# Patient Record
Sex: MALE | Race: WHITE | Employment: OTHER | URBAN - METROPOLITAN AREA
[De-identification: names, ages, dates, MRNs, and addresses within clinical notes are randomized per-mention and may not be internally consistent; named-entity substitution may affect disease eponyms.]

---

## 2017-05-25 ENCOUNTER — HOSPITAL ENCOUNTER (OUTPATIENT)
Dept: MRI IMAGING | Age: 72
Discharge: HOME | End: 2017-05-25
Attending: PHYSICAL MEDICINE & REHABILITATION
Payer: MEDICARE

## 2017-05-25 DIAGNOSIS — M51.36 DDD (DEGENERATIVE DISC DISEASE), LUMBAR: ICD-10-CM

## 2017-05-25 PROCEDURE — 72148 MRI LUMBAR SPINE W/O DYE: CPT

## 2017-06-22 ENCOUNTER — HOSPITAL ENCOUNTER (OUTPATIENT)
Dept: MRI IMAGING | Age: 72
Discharge: HOME | End: 2017-06-22
Attending: ORTHOPAEDIC SURGERY
Payer: MEDICARE

## 2017-06-22 DIAGNOSIS — M25.562 LEFT KNEE PAIN: ICD-10-CM

## 2017-06-22 PROCEDURE — 73721 MRI JNT OF LWR EXTRE W/O DYE: CPT

## 2018-03-14 ENCOUNTER — HOSPITAL ENCOUNTER (OUTPATIENT)
Age: 73
Setting detail: OUTPATIENT SURGERY
Discharge: HOME | End: 2018-03-14
Attending: INTERNAL MEDICINE | Admitting: INTERNAL MEDICINE
Payer: MEDICARE

## 2018-03-14 ENCOUNTER — ANESTHESIA EVENT (OUTPATIENT)
Dept: ENDOSCOPY | Age: 73
End: 2018-03-14
Payer: MEDICARE

## 2018-03-14 ENCOUNTER — ANESTHESIA (OUTPATIENT)
Dept: ENDOSCOPY | Age: 73
End: 2018-03-14
Payer: MEDICARE

## 2018-03-14 VITALS
WEIGHT: 200 LBS | HEIGHT: 73 IN | HEART RATE: 65 BPM | DIASTOLIC BLOOD PRESSURE: 78 MMHG | BODY MASS INDEX: 26.51 KG/M2 | TEMPERATURE: 97.9 F | RESPIRATION RATE: 14 BRPM | OXYGEN SATURATION: 100 % | SYSTOLIC BLOOD PRESSURE: 123 MMHG

## 2018-03-14 PROCEDURE — 76040000019: Performed by: INTERNAL MEDICINE

## 2018-03-14 PROCEDURE — 77030009426 HC FCPS BIOP ENDOSC BSC -B: Performed by: INTERNAL MEDICINE

## 2018-03-14 PROCEDURE — 74011250636 HC RX REV CODE- 250/636

## 2018-03-14 PROCEDURE — 74011000250 HC RX REV CODE- 250

## 2018-03-14 PROCEDURE — 76060000031 HC ANESTHESIA FIRST 0.5 HR: Performed by: INTERNAL MEDICINE

## 2018-03-14 PROCEDURE — 88305 TISSUE EXAM BY PATHOLOGIST: CPT | Performed by: INTERNAL MEDICINE

## 2018-03-14 RX ORDER — PROPOFOL 10 MG/ML
INJECTION, EMULSION INTRAVENOUS AS NEEDED
Status: DISCONTINUED | OUTPATIENT
Start: 2018-03-14 | End: 2018-03-14 | Stop reason: HOSPADM

## 2018-03-14 RX ORDER — SODIUM CHLORIDE 9 MG/ML
INJECTION, SOLUTION INTRAVENOUS
Status: DISCONTINUED | OUTPATIENT
Start: 2018-03-14 | End: 2018-03-14 | Stop reason: HOSPADM

## 2018-03-14 RX ORDER — MIDAZOLAM HYDROCHLORIDE 1 MG/ML
.25-5 INJECTION, SOLUTION INTRAMUSCULAR; INTRAVENOUS
Status: DISCONTINUED | OUTPATIENT
Start: 2018-03-14 | End: 2018-03-14 | Stop reason: HOSPADM

## 2018-03-14 RX ORDER — SODIUM CHLORIDE 0.9 % (FLUSH) 0.9 %
5-10 SYRINGE (ML) INJECTION EVERY 8 HOURS
Status: DISCONTINUED | OUTPATIENT
Start: 2018-03-14 | End: 2018-03-14 | Stop reason: HOSPADM

## 2018-03-14 RX ORDER — FENTANYL CITRATE 50 UG/ML
25 INJECTION, SOLUTION INTRAMUSCULAR; INTRAVENOUS
Status: DISCONTINUED | OUTPATIENT
Start: 2018-03-14 | End: 2018-03-14 | Stop reason: HOSPADM

## 2018-03-14 RX ORDER — MIDAZOLAM HYDROCHLORIDE 1 MG/ML
1-2 INJECTION, SOLUTION INTRAMUSCULAR; INTRAVENOUS
Status: DISCONTINUED | OUTPATIENT
Start: 2018-03-14 | End: 2018-03-14 | Stop reason: HOSPADM

## 2018-03-14 RX ORDER — DEXTROMETHORPHAN/PSEUDOEPHED 2.5-7.5/.8
1.2 DROPS ORAL
Status: DISCONTINUED | OUTPATIENT
Start: 2018-03-14 | End: 2018-03-14 | Stop reason: HOSPADM

## 2018-03-14 RX ORDER — SODIUM CHLORIDE 0.9 % (FLUSH) 0.9 %
5-10 SYRINGE (ML) INJECTION AS NEEDED
Status: DISCONTINUED | OUTPATIENT
Start: 2018-03-14 | End: 2018-03-14 | Stop reason: HOSPADM

## 2018-03-14 RX ORDER — FLUMAZENIL 0.1 MG/ML
0.2 INJECTION INTRAVENOUS
Status: DISCONTINUED | OUTPATIENT
Start: 2018-03-14 | End: 2018-03-14 | Stop reason: HOSPADM

## 2018-03-14 RX ORDER — ATROPINE SULFATE 0.1 MG/ML
0.5 INJECTION INTRAVENOUS
Status: DISCONTINUED | OUTPATIENT
Start: 2018-03-14 | End: 2018-03-14 | Stop reason: HOSPADM

## 2018-03-14 RX ORDER — NALOXONE HYDROCHLORIDE 0.4 MG/ML
0.4 INJECTION, SOLUTION INTRAMUSCULAR; INTRAVENOUS; SUBCUTANEOUS
Status: DISCONTINUED | OUTPATIENT
Start: 2018-03-14 | End: 2018-03-14 | Stop reason: HOSPADM

## 2018-03-14 RX ORDER — EPINEPHRINE 0.1 MG/ML
1 INJECTION INTRACARDIAC; INTRAVENOUS
Status: DISCONTINUED | OUTPATIENT
Start: 2018-03-14 | End: 2018-03-14 | Stop reason: HOSPADM

## 2018-03-14 RX ORDER — SODIUM CHLORIDE 9 MG/ML
75 INJECTION, SOLUTION INTRAVENOUS CONTINUOUS
Status: DISCONTINUED | OUTPATIENT
Start: 2018-03-14 | End: 2018-03-14 | Stop reason: HOSPADM

## 2018-03-14 RX ORDER — LIDOCAINE HYDROCHLORIDE 20 MG/ML
INJECTION, SOLUTION EPIDURAL; INFILTRATION; INTRACAUDAL; PERINEURAL AS NEEDED
Status: DISCONTINUED | OUTPATIENT
Start: 2018-03-14 | End: 2018-03-14 | Stop reason: HOSPADM

## 2018-03-14 RX ADMIN — PROPOFOL 30 MG: 10 INJECTION, EMULSION INTRAVENOUS at 15:45

## 2018-03-14 RX ADMIN — PROPOFOL 20 MG: 10 INJECTION, EMULSION INTRAVENOUS at 15:51

## 2018-03-14 RX ADMIN — PROPOFOL 30 MG: 10 INJECTION, EMULSION INTRAVENOUS at 15:48

## 2018-03-14 RX ADMIN — SODIUM CHLORIDE: 9 INJECTION, SOLUTION INTRAVENOUS at 15:30

## 2018-03-14 RX ADMIN — PROPOFOL 30 MG: 10 INJECTION, EMULSION INTRAVENOUS at 15:50

## 2018-03-14 RX ADMIN — PROPOFOL 70 MG: 10 INJECTION, EMULSION INTRAVENOUS at 15:43

## 2018-03-14 RX ADMIN — LIDOCAINE HYDROCHLORIDE 50 MG: 20 INJECTION, SOLUTION EPIDURAL; INFILTRATION; INTRACAUDAL; PERINEURAL at 15:42

## 2018-03-14 NOTE — ROUTINE PROCESS
Nakia Mccloud  1945  [id number]    Situation:  Verbal report received from: Elizabeth Mccloud RN  Procedure: Procedure(s):  COLONOSCOPY  COLON BIOPSY    Background:    Preoperative diagnosis: colon  Postoperative diagnosis: diverticulosis , hemorrhoids     :  Dr. Marylu Sousa  Assistant(s): Endoscopy Technician-1: Rasta Pearson  Endoscopy RN-1: Elizabeth Mccloud RN    Specimens:   ID Type Source Tests Collected by Time Destination   1 : ascendign colon bx  Preservative Colon, Ascending  Teri Matthew MD 3/14/2018 1551 Pathology     H. Pylori  no      Anesthesia gave intra-procedure sedation and medications, see anesthesia flow sheet yes    Intravenous fluids: NS@ KVO     Vital signs stable      Abdominal assessment: round and soft      Recommendation:  Discharge patient per MD order .      Family or Friend    Permission to share finding with family or friend yes

## 2018-03-14 NOTE — PROCEDURES
NAME:  Balaji Nathan. :   1945   MRN:   [id number]     Date/Time:  3/14/2018 3:59 PM    Colonoscopy Operative Report    Procedure Type:   Colonoscopy with biopsy     Indications:     Constipation, Change in bowel habits  Pre-operative Diagnosis: see indication above  Post-operative Diagnosis:  See findings below  :  Mary Ellen Chung MD  Referring Provider: -Ave Ford MD    Exam:  Airway: clear, no airway problems anticipated  Heart: RRR, without gallops or rubs  Lungs: clear bilaterally without wheezes, crackles, or rhonchi  Abdomen: soft, nontender, nondistended, bowel sounds present  Mental Status: awake, alert and oriented to person, place and time    Sedation:  MAC anesthesia Propofol 180mg IV  Procedure Details:  After informed consent was obtained with all risks and benefits of procedure explained and preoperative exam completed, the patient was taken to the endoscopy suite and placed in the left lateral decubitus position. Upon sequential sedation as per above, a digital rectal exam was performed demonstrating internal hemorrhoids. The Olympus videocolonoscope  was inserted in the rectum and carefully advanced to the cecum, which was identified by the ileocecal valve and appendiceal orifice. The distal terminal ileum was evaluated. The quality of preparation was adequate. The colonoscope was slowly withdrawn with careful evaluation between folds. Retroflexion in the rectum was completed demonstrating internal hemorrhoids. Findings:     -Normal terminal ileum  -Rare shallow diverticula seen scattered throughout the colon  -No masses, polyps, or inflammation are identified. Biopsies obtained in ascending colon to exclude microscopic colitis    Specimen Removed:  #1 asc colon  Complications: None. EBL:  None. Impression:    -Normal terminal ileum  -Rare shallow diverticula seen scattered throughout the colon  -No masses, polyps, or inflammation are identified.   Biopsies obtained in ascending colon to exclude microscopic colitis    Recommendations: --Await pathology. , -No further colonoscopy indicated. High fiber diet. Resume normal medication(s). You will receive a letter about the biopsy results in about 10 days. You may be asked to call your doctor's office for the results. Discharge Disposition:  Home in the company of a  when able to ambulate.     Mary David MD

## 2018-03-14 NOTE — DISCHARGE INSTRUCTIONS
[facility]  [id number]  1945    COLON DISCHARGE INSTRUCTIONS  Discomfort:  Redness at IV site- apply warm compress to area; if redness or soreness persist- contact your physician  There may be a slight amount of blood passed from the rectum  Gaseous discomfort- walking, belching will help relieve any discomfort  You may not operate a vehicle for 12 hours  You may not engage in an occupation involving machinery or appliances for rest of today  You may not drink alcoholic beverages for at least 12 hours  Avoid making any critical decisions for at least 24 hour  DIET:   High fiber diet. - however -  remember your colon is empty and a heavy meal will produce gas. Avoid these foods:  vegetables, fried / greasy foods, carbonated drinks for today  MEDICATION:         ACTIVITY:  You may not resume your normal daily activities until tomorrow AM; it is recommended that you spend the remainder of the day resting -  avoid any strenuous activity. CALL M.D. ANY SIGN OF:   Increasing pain, nausea, vomiting  Abdominal distension (swelling)  New increased bleeding (oral or rectal)  Fever (chills)    IMPRESSION:  -Normal terminal ileum  -Rare shallow diverticula seen scattered throughout the colon  -No masses, polyps, or inflammation are identified. Biopsies obtained in ascending colon to exclude microscopic colitis    Follow-up Instructions:   Call Dr. Sherice Gaitan for the results of procedure / biopsy in 7-10 days  Telephone # [phone]  No repeat colonoscopy indicated    Tamie Chavez MD      [employer] Activation    Thank you for requesting access to [employer]. Please follow the instructions below to securely access and download your online medical record. [employer] allows you to send messages to your doctor, view your test results, renew your prescriptions, schedule appointments, and more. How Do I Sign Up? 1. In your internet browser, go to [email or web address]  2. Click on the First Time User?  Click Here link in the Sign In box. You will be redirect to the New Member Sign Up page. 3. Enter your [employer] Access Code exactly as it appears below. You will not need to use this code after youve completed the sign-up process. If you do not sign up before the expiration date, you must request a new code. [employer] Access Code: [email or web address]  Expires: 2018  2:21 PM (This is the date your [employer] access code will )    4. Enter the last four digits of your Social Security Number (xxxx) and Date of Birth (mm/dd/yyyy) as indicated and click Submit. You will be taken to the next sign-up page. 5. Create a [employer] ID. This will be your [employer] login ID and cannot be changed, so think of one that is secure and easy to remember. 6. Create a [employer] password. You can change your password at any time. 7. Enter your Password Reset Question and Answer. This can be used at a later time if you forget your password. 8. Enter your e-mail address. You will receive e-mail notification when new information is available in [address] 9. Click Sign Up. You can now view and download portions of your medical record. 10. Click the Download Summary menu link to download a portable copy of your medical information. Additional Information    If you have questions, please visit the Frequently Asked Questions section of the [employer] website at [email or web address] Remember, [employer] is NOT to be used for urgent needs. For medical emergencies, dial 911.

## 2018-03-14 NOTE — ANESTHESIA POSTPROCEDURE EVALUATION
Post-Anesthesia Evaluation and Assessment    Patient: Robert Kelley MRN: [id number]  SSN: [id number]    YOB: 1945  Age: 75 y.o. Sex: male       Cardiovascular Function/Vital Signs  Visit Vitals    /63    Pulse 60    Temp 36.6 °C (97.9 °F)    Resp 18    Ht 6' 1\" (1.854 m)    Wt 90.7 kg (200 lb)    SpO2 100%    BMI 26.39 kg/m2       Patient is status post general, total IV anesthesia anesthesia for Procedure(s):  COLONOSCOPY  COLON BIOPSY. Nausea/Vomiting: None    Postoperative hydration reviewed and adequate. Pain:  Pain Scale 1: Numeric (0 - 10) (03/14/18 1612)  Pain Intensity 1: 0 (03/14/18 1612)   Managed    Neurological Status: At baseline    Mental Status and Level of Consciousness: Arousable    Pulmonary Status:   O2 Device: Room air (03/14/18 1612)   Adequate oxygenation and airway patent    Complications related to anesthesia: None    Post-anesthesia assessment completed.  No concerns    Signed By: Saul Lane MD     March 14, 2018

## 2018-03-14 NOTE — ANESTHESIA PREPROCEDURE EVALUATION
Anesthetic History   No history of anesthetic complications            Review of Systems / Medical History  Patient summary reviewed, nursing notes reviewed and pertinent labs reviewed    Pulmonary  Within defined limits                 Neuro/Psych   Within defined limits           Cardiovascular    Hypertension              Exercise tolerance: >4 METS     GI/Hepatic/Renal  Within defined limits              Endo/Other        Arthritis     Other Findings            Physical Exam    Airway  Mallampati: II  TM Distance: 4 - 6 cm  Neck ROM: normal range of motion   Mouth opening: Normal     Cardiovascular  Regular rate and rhythm,  S1 and S2 normal,  no murmur, click, rub, or gallop             Dental  No notable dental hx       Pulmonary  Breath sounds clear to auscultation               Abdominal  GI exam deferred       Other Findings            Anesthetic Plan    ASA: 2  Anesthesia type: general and total IV anesthesia          Induction: Intravenous  Anesthetic plan and risks discussed with: Patient

## 2018-10-02 ENCOUNTER — HOSPITAL ENCOUNTER (OUTPATIENT)
Dept: PREADMISSION TESTING | Age: 73
Discharge: HOME | End: 2018-10-02
Payer: MEDICARE

## 2018-10-02 VITALS
WEIGHT: 210 LBS | SYSTOLIC BLOOD PRESSURE: 124 MMHG | BODY MASS INDEX: 27.83 KG/M2 | HEART RATE: 69 BPM | DIASTOLIC BLOOD PRESSURE: 64 MMHG | HEIGHT: 73 IN | TEMPERATURE: 97.9 F

## 2018-10-02 LAB
ABO + RH BLD: NORMAL
ALBUMIN SERPL-MCNC: 3.9 G/DL (ref 3.5–5)
ALBUMIN/GLOB SERPL: 1.1 {RATIO} (ref 1.1–2.2)
ALP SERPL-CCNC: 70 U/L (ref 45–117)
ALT SERPL-CCNC: 19 U/L (ref 12–78)
ANION GAP SERPL CALC-SCNC: 9 MMOL/L (ref 5–15)
APPEARANCE UR: CLEAR
AST SERPL-CCNC: 13 U/L (ref 15–37)
ATRIAL RATE: 57 BPM
BACTERIA URNS QL MICRO: NEGATIVE /HPF
BASOPHILS # BLD: 0 K/UL (ref 0–0.1)
BASOPHILS NFR BLD: 0 % (ref 0–1)
BILIRUB SERPL-MCNC: 0.5 MG/DL (ref 0.2–1)
BILIRUB UR QL: NEGATIVE
BLOOD GROUP ANTIBODIES SERPL: NORMAL
BUN SERPL-MCNC: 17 MG/DL (ref 6–20)
BUN/CREAT SERPL: 20 (ref 12–20)
CALCIUM SERPL-MCNC: 9.4 MG/DL (ref 8.5–10.1)
CALCULATED P AXIS, ECG09: 50 DEGREES
CALCULATED R AXIS, ECG10: 74 DEGREES
CALCULATED T AXIS, ECG11: 66 DEGREES
CHLORIDE SERPL-SCNC: 104 MMOL/L (ref 97–108)
CO2 SERPL-SCNC: 27 MMOL/L (ref 21–32)
COLOR UR: NORMAL
CREAT SERPL-MCNC: 0.83 MG/DL (ref 0.7–1.3)
DIAGNOSIS, 93000: NORMAL
DIFFERENTIAL METHOD BLD: ABNORMAL
EOSINOPHIL # BLD: 0 K/UL (ref 0–0.4)
EOSINOPHIL NFR BLD: 0 % (ref 0–7)
EPITH CASTS URNS QL MICRO: NORMAL /LPF
ERYTHROCYTE [DISTWIDTH] IN BLOOD BY AUTOMATED COUNT: 14 % (ref 11.5–14.5)
GLOBULIN SER CALC-MCNC: 3.4 G/DL (ref 2–4)
GLUCOSE SERPL-MCNC: 112 MG/DL (ref 65–100)
GLUCOSE UR STRIP.AUTO-MCNC: NEGATIVE MG/DL
HCT VFR BLD AUTO: 47.8 % (ref 36.6–50.3)
HGB BLD-MCNC: 15.9 G/DL (ref 12.1–17)
HGB UR QL STRIP: NEGATIVE
HYALINE CASTS URNS QL MICRO: NORMAL /LPF (ref 0–5)
IMM GRANULOCYTES # BLD: 0.1 K/UL (ref 0–0.04)
IMM GRANULOCYTES NFR BLD AUTO: 1 % (ref 0–0.5)
KETONES UR QL STRIP.AUTO: NEGATIVE MG/DL
LEUKOCYTE ESTERASE UR QL STRIP.AUTO: NEGATIVE
LYMPHOCYTES # BLD: 0.8 K/UL (ref 0.8–3.5)
LYMPHOCYTES NFR BLD: 8 % (ref 12–49)
MCH RBC QN AUTO: 29.2 PG (ref 26–34)
MCHC RBC AUTO-ENTMCNC: 33.3 G/DL (ref 30–36.5)
MCV RBC AUTO: 87.9 FL (ref 80–99)
MONOCYTES # BLD: 0.6 K/UL (ref 0–1)
MONOCYTES NFR BLD: 6 % (ref 5–13)
NEUTS SEG # BLD: 8.6 K/UL (ref 1.8–8)
NEUTS SEG NFR BLD: 85 % (ref 32–75)
NITRITE UR QL STRIP.AUTO: NEGATIVE
NRBC # BLD: 0 K/UL (ref 0–0.01)
NRBC BLD-RTO: 0 PER 100 WBC
P-R INTERVAL, ECG05: 268 MS
PH UR STRIP: 6.5 [PH] (ref 5–8)
PLATELET # BLD AUTO: 233 K/UL (ref 150–400)
PMV BLD AUTO: 10.7 FL (ref 8.9–12.9)
POTASSIUM SERPL-SCNC: 4.1 MMOL/L (ref 3.5–5.1)
PROT SERPL-MCNC: 7.3 G/DL (ref 6.4–8.2)
PROT UR STRIP-MCNC: NEGATIVE MG/DL
Q-T INTERVAL, ECG07: 440 MS
QRS DURATION, ECG06: 90 MS
QTC CALCULATION (BEZET), ECG08: 428 MS
RBC # BLD AUTO: 5.44 M/UL (ref 4.1–5.7)
RBC #/AREA URNS HPF: NORMAL /HPF (ref 0–5)
SODIUM SERPL-SCNC: 140 MMOL/L (ref 136–145)
SP GR UR REFRACTOMETRY: 1.01 (ref 1–1.03)
SPECIMEN EXP DATE BLD: NORMAL
UROBILINOGEN UR QL STRIP.AUTO: 0.2 EU/DL (ref 0.2–1)
VENTRICULAR RATE, ECG03: 57 BPM
WBC # BLD AUTO: 10.1 K/UL (ref 4.1–11.1)
WBC URNS QL MICRO: NORMAL /HPF (ref 0–4)

## 2018-10-02 PROCEDURE — 36415 COLL VENOUS BLD VENIPUNCTURE: CPT | Performed by: UROLOGY

## 2018-10-02 PROCEDURE — 81001 URINALYSIS AUTO W/SCOPE: CPT | Performed by: UROLOGY

## 2018-10-02 PROCEDURE — 93005 ELECTROCARDIOGRAM TRACING: CPT

## 2018-10-02 PROCEDURE — 86900 BLOOD TYPING SEROLOGIC ABO: CPT | Performed by: UROLOGY

## 2018-10-02 PROCEDURE — 85025 COMPLETE CBC W/AUTO DIFF WBC: CPT | Performed by: UROLOGY

## 2018-10-02 PROCEDURE — 87086 URINE CULTURE/COLONY COUNT: CPT | Performed by: UROLOGY

## 2018-10-02 PROCEDURE — 80053 COMPREHEN METABOLIC PANEL: CPT | Performed by: UROLOGY

## 2018-10-02 RX ORDER — GLUCOSAM/CHONDRO/HERB 149/HYAL 750-100 MG
4 TABLET ORAL DAILY
COMMUNITY
End: 2018-10-19

## 2018-10-02 RX ORDER — ASPIRIN 81 MG/1
TABLET ORAL DAILY
COMMUNITY
End: 2018-10-19

## 2018-10-03 LAB
BACTERIA SPEC CULT: NORMAL
CC UR VC: NORMAL
SERVICE CMNT-IMP: NORMAL

## 2018-10-16 ENCOUNTER — ANESTHESIA EVENT (OUTPATIENT)
Dept: SURGERY | Age: 73
DRG: 708 | End: 2018-10-16
Payer: MEDICARE

## 2018-10-16 ENCOUNTER — ANESTHESIA (OUTPATIENT)
Dept: SURGERY | Age: 73
DRG: 708 | End: 2018-10-16
Payer: MEDICARE

## 2018-10-16 ENCOUNTER — HOSPITAL ENCOUNTER (INPATIENT)
Age: 73
LOS: 3 days | Discharge: HOME HEALTH | DRG: 708 | End: 2018-10-19
Attending: UROLOGY | Admitting: UROLOGY
Payer: MEDICARE

## 2018-10-16 DIAGNOSIS — Z90.79 S/P PROSTATECTOMY: Primary | ICD-10-CM

## 2018-10-16 PROBLEM — N40.0 BPH (BENIGN PROSTATIC HYPERPLASIA): Status: ACTIVE | Noted: 2018-10-16

## 2018-10-16 PROCEDURE — 77030011640 HC PAD GRND REM COVD -A: Performed by: UROLOGY

## 2018-10-16 PROCEDURE — 0VB04ZZ EXCISION OF PROSTATE, PERCUTANEOUS ENDOSCOPIC APPROACH: ICD-10-PCS | Performed by: UROLOGY

## 2018-10-16 PROCEDURE — 88307 TISSUE EXAM BY PATHOLOGIST: CPT

## 2018-10-16 PROCEDURE — 77030014650 HC SEAL MTRX FLOSEL BAXT -C: Performed by: UROLOGY

## 2018-10-16 PROCEDURE — 77030008684 HC TU ET CUF COVD -B: Performed by: ANESTHESIOLOGY

## 2018-10-16 PROCEDURE — 77030012407 HC DRN WND BARD -B: Performed by: UROLOGY

## 2018-10-16 PROCEDURE — 77030013567 HC DRN WND RESERV BARD -A: Performed by: UROLOGY

## 2018-10-16 PROCEDURE — 74011250637 HC RX REV CODE- 250/637: Performed by: UROLOGY

## 2018-10-16 PROCEDURE — 0V504ZZ DESTRUCTION OF PROSTATE, PERCUTANEOUS ENDOSCOPIC APPROACH: ICD-10-PCS | Performed by: UROLOGY

## 2018-10-16 PROCEDURE — 77030002896 HC SUT CLP ABSRB J&J -B: Performed by: UROLOGY

## 2018-10-16 PROCEDURE — 76210000016 HC OR PH I REC 1 TO 1.5 HR: Performed by: UROLOGY

## 2018-10-16 PROCEDURE — 0TJB8ZZ INSPECTION OF BLADDER, VIA NATURAL OR ARTIFICIAL OPENING ENDOSCOPIC: ICD-10-PCS | Performed by: UROLOGY

## 2018-10-16 PROCEDURE — C1769 GUIDE WIRE: HCPCS | Performed by: UROLOGY

## 2018-10-16 PROCEDURE — 77030022704 HC SUT VLOC COVD -B: Performed by: UROLOGY

## 2018-10-16 PROCEDURE — C1758 CATHETER, URETERAL: HCPCS | Performed by: UROLOGY

## 2018-10-16 PROCEDURE — 74011250636 HC RX REV CODE- 250/636: Performed by: ANESTHESIOLOGY

## 2018-10-16 PROCEDURE — 77030005546 HC CATH URETH FOL 3W BARD -A: Performed by: UROLOGY

## 2018-10-16 PROCEDURE — 77030002916 HC SUT ETHLN J&J -A: Performed by: UROLOGY

## 2018-10-16 PROCEDURE — 77030002933 HC SUT MCRYL J&J -A: Performed by: UROLOGY

## 2018-10-16 PROCEDURE — 77030018832 HC SOL IRR H20 ICUM -A: Performed by: UROLOGY

## 2018-10-16 PROCEDURE — 77030012024 HC APPL CLP LAPRA J&J -G1: Performed by: UROLOGY

## 2018-10-16 PROCEDURE — 77030002966 HC SUT PDS J&J -A: Performed by: UROLOGY

## 2018-10-16 PROCEDURE — 65270000029 HC RM PRIVATE

## 2018-10-16 PROCEDURE — 77030008756 HC TU IRR SUC STRY -B: Performed by: UROLOGY

## 2018-10-16 PROCEDURE — 77030007955 HC PCH ENDOSC SPEC J&J -B: Performed by: UROLOGY

## 2018-10-16 PROCEDURE — 77030010935 HC CLP LIG ABSRB TELE -B: Performed by: UROLOGY

## 2018-10-16 PROCEDURE — 74011000250 HC RX REV CODE- 250: Performed by: UROLOGY

## 2018-10-16 PROCEDURE — 88305 TISSUE EXAM BY PATHOLOGIST: CPT | Performed by: UROLOGY

## 2018-10-16 PROCEDURE — 77030010517 HC APPL SEAL FLOSEL BAXT -B: Performed by: UROLOGY

## 2018-10-16 PROCEDURE — 77030019908 HC STETH ESOPH SIMS -A: Performed by: ANESTHESIOLOGY

## 2018-10-16 PROCEDURE — 74011250636 HC RX REV CODE- 250/636

## 2018-10-16 PROCEDURE — 77030035277 HC OBTRTR BLDELSS DISP INTU -B: Performed by: UROLOGY

## 2018-10-16 PROCEDURE — 74011250636 HC RX REV CODE- 250/636: Performed by: UROLOGY

## 2018-10-16 PROCEDURE — 8E0W4CZ ROBOTIC ASSISTED PROCEDURE OF TRUNK REGION, PERCUTANEOUS ENDOSCOPIC APPROACH: ICD-10-PCS | Performed by: UROLOGY

## 2018-10-16 PROCEDURE — 76060000037 HC ANESTHESIA 3 TO 3.5 HR: Performed by: UROLOGY

## 2018-10-16 PROCEDURE — 74011000250 HC RX REV CODE- 250

## 2018-10-16 PROCEDURE — 77030026438 HC STYL ET INTUB CARD -A: Performed by: ANESTHESIOLOGY

## 2018-10-16 PROCEDURE — 77030013079 HC BLNKT BAIR HGGR 3M -A: Performed by: ANESTHESIOLOGY

## 2018-10-16 PROCEDURE — 77030031139 HC SUT VCRL2 J&J -A: Performed by: UROLOGY

## 2018-10-16 PROCEDURE — 77030032490 HC SLV COMPR SCD KNE COVD -B: Performed by: UROLOGY

## 2018-10-16 PROCEDURE — 77030019927 HC TBNG IRR CYSTO BAXT -A: Performed by: UROLOGY

## 2018-10-16 PROCEDURE — 76010000878 HC OR TIME 3 TO 3.5HR INTENSV - TIER 2: Performed by: UROLOGY

## 2018-10-16 PROCEDURE — 77030016151 HC PROTCTR LNS DFOG COVD -B: Performed by: UROLOGY

## 2018-10-16 PROCEDURE — 77030039266 HC ADH SKN EXOFIN S2SG -A: Performed by: UROLOGY

## 2018-10-16 PROCEDURE — 77030003580 HC NDL INSUF VERES J&J -B: Performed by: UROLOGY

## 2018-10-16 RX ORDER — MIDAZOLAM HYDROCHLORIDE 1 MG/ML
1 INJECTION, SOLUTION INTRAMUSCULAR; INTRAVENOUS AS NEEDED
Status: DISCONTINUED | OUTPATIENT
Start: 2018-10-16 | End: 2018-10-16 | Stop reason: HOSPADM

## 2018-10-16 RX ORDER — HYDROMORPHONE HYDROCHLORIDE 2 MG/ML
INJECTION, SOLUTION INTRAMUSCULAR; INTRAVENOUS; SUBCUTANEOUS AS NEEDED
Status: DISCONTINUED | OUTPATIENT
Start: 2018-10-16 | End: 2018-10-16 | Stop reason: HOSPADM

## 2018-10-16 RX ORDER — ROPIVACAINE HYDROCHLORIDE 5 MG/ML
30 INJECTION, SOLUTION EPIDURAL; INFILTRATION; PERINEURAL ONCE
Status: DISCONTINUED | OUTPATIENT
Start: 2018-10-16 | End: 2018-10-16 | Stop reason: HOSPADM

## 2018-10-16 RX ORDER — CEFAZOLIN SODIUM/WATER 2 G/20 ML
2 SYRINGE (ML) INTRAVENOUS EVERY 8 HOURS
Status: COMPLETED | OUTPATIENT
Start: 2018-10-16 | End: 2018-10-17

## 2018-10-16 RX ORDER — ONDANSETRON 2 MG/ML
4 INJECTION INTRAMUSCULAR; INTRAVENOUS
Status: DISCONTINUED | OUTPATIENT
Start: 2018-10-16 | End: 2018-10-19 | Stop reason: HOSPADM

## 2018-10-16 RX ORDER — SODIUM CHLORIDE 9 MG/ML
50 INJECTION, SOLUTION INTRAVENOUS CONTINUOUS
Status: DISCONTINUED | OUTPATIENT
Start: 2018-10-16 | End: 2018-10-16 | Stop reason: HOSPADM

## 2018-10-16 RX ORDER — HYDROCODONE BITARTRATE AND ACETAMINOPHEN 7.5; 325 MG/1; MG/1
1 TABLET ORAL
Status: DISCONTINUED | OUTPATIENT
Start: 2018-10-16 | End: 2018-10-19 | Stop reason: HOSPADM

## 2018-10-16 RX ORDER — SODIUM CHLORIDE, SODIUM LACTATE, POTASSIUM CHLORIDE, CALCIUM CHLORIDE 600; 310; 30; 20 MG/100ML; MG/100ML; MG/100ML; MG/100ML
75 INJECTION, SOLUTION INTRAVENOUS CONTINUOUS
Status: DISCONTINUED | OUTPATIENT
Start: 2018-10-16 | End: 2018-10-16 | Stop reason: HOSPADM

## 2018-10-16 RX ORDER — SODIUM CHLORIDE 0.9 % (FLUSH) 0.9 %
5-10 SYRINGE (ML) INJECTION AS NEEDED
Status: DISCONTINUED | OUTPATIENT
Start: 2018-10-16 | End: 2018-10-16 | Stop reason: HOSPADM

## 2018-10-16 RX ORDER — ATROPA BELLADONNA AND OPIUM 16.2; 6 MG/1; MG/1
1 SUPPOSITORY RECTAL
Status: DISCONTINUED | OUTPATIENT
Start: 2018-10-16 | End: 2018-10-19 | Stop reason: HOSPADM

## 2018-10-16 RX ORDER — DEXAMETHASONE SODIUM PHOSPHATE 4 MG/ML
INJECTION, SOLUTION INTRA-ARTICULAR; INTRALESIONAL; INTRAMUSCULAR; INTRAVENOUS; SOFT TISSUE AS NEEDED
Status: DISCONTINUED | OUTPATIENT
Start: 2018-10-16 | End: 2018-10-16 | Stop reason: HOSPADM

## 2018-10-16 RX ORDER — ONDANSETRON 2 MG/ML
4 INJECTION INTRAMUSCULAR; INTRAVENOUS AS NEEDED
Status: DISCONTINUED | OUTPATIENT
Start: 2018-10-16 | End: 2018-10-16 | Stop reason: HOSPADM

## 2018-10-16 RX ORDER — FENTANYL CITRATE 50 UG/ML
25 INJECTION, SOLUTION INTRAMUSCULAR; INTRAVENOUS
Status: DISCONTINUED | OUTPATIENT
Start: 2018-10-16 | End: 2018-10-16 | Stop reason: HOSPADM

## 2018-10-16 RX ORDER — MORPHINE SULFATE 10 MG/ML
2 INJECTION, SOLUTION INTRAMUSCULAR; INTRAVENOUS
Status: DISCONTINUED | OUTPATIENT
Start: 2018-10-16 | End: 2018-10-16 | Stop reason: HOSPADM

## 2018-10-16 RX ORDER — LIDOCAINE HYDROCHLORIDE 10 MG/ML
0.1 INJECTION, SOLUTION EPIDURAL; INFILTRATION; INTRACAUDAL; PERINEURAL AS NEEDED
Status: DISCONTINUED | OUTPATIENT
Start: 2018-10-16 | End: 2018-10-16 | Stop reason: HOSPADM

## 2018-10-16 RX ORDER — LIDOCAINE HYDROCHLORIDE 20 MG/ML
INJECTION, SOLUTION EPIDURAL; INFILTRATION; INTRACAUDAL; PERINEURAL AS NEEDED
Status: DISCONTINUED | OUTPATIENT
Start: 2018-10-16 | End: 2018-10-16 | Stop reason: HOSPADM

## 2018-10-16 RX ORDER — EPHEDRINE SULFATE 50 MG/ML
INJECTION, SOLUTION INTRAVENOUS AS NEEDED
Status: DISCONTINUED | OUTPATIENT
Start: 2018-10-16 | End: 2018-10-16 | Stop reason: HOSPADM

## 2018-10-16 RX ORDER — ACETAMINOPHEN 325 MG/1
650 TABLET ORAL
Status: DISCONTINUED | OUTPATIENT
Start: 2018-10-16 | End: 2018-10-19 | Stop reason: HOSPADM

## 2018-10-16 RX ORDER — MIDAZOLAM HYDROCHLORIDE 1 MG/ML
0.5 INJECTION, SOLUTION INTRAMUSCULAR; INTRAVENOUS
Status: DISCONTINUED | OUTPATIENT
Start: 2018-10-16 | End: 2018-10-16 | Stop reason: HOSPADM

## 2018-10-16 RX ORDER — SODIUM CHLORIDE, SODIUM LACTATE, POTASSIUM CHLORIDE, CALCIUM CHLORIDE 600; 310; 30; 20 MG/100ML; MG/100ML; MG/100ML; MG/100ML
75 INJECTION, SOLUTION INTRAVENOUS CONTINUOUS
Status: DISCONTINUED | OUTPATIENT
Start: 2018-10-16 | End: 2018-10-17

## 2018-10-16 RX ORDER — FENTANYL CITRATE 50 UG/ML
50 INJECTION, SOLUTION INTRAMUSCULAR; INTRAVENOUS AS NEEDED
Status: DISCONTINUED | OUTPATIENT
Start: 2018-10-16 | End: 2018-10-16 | Stop reason: HOSPADM

## 2018-10-16 RX ORDER — HYDROMORPHONE HYDROCHLORIDE 2 MG/ML
1 INJECTION, SOLUTION INTRAMUSCULAR; INTRAVENOUS; SUBCUTANEOUS
Status: DISCONTINUED | OUTPATIENT
Start: 2018-10-16 | End: 2018-10-19 | Stop reason: HOSPADM

## 2018-10-16 RX ORDER — NEOSTIGMINE METHYLSULFATE 1 MG/ML
INJECTION INTRAVENOUS AS NEEDED
Status: DISCONTINUED | OUTPATIENT
Start: 2018-10-16 | End: 2018-10-16 | Stop reason: HOSPADM

## 2018-10-16 RX ORDER — HYDROCHLOROTHIAZIDE 25 MG/1
25 TABLET ORAL 2 TIMES DAILY
Status: DISCONTINUED | OUTPATIENT
Start: 2018-10-17 | End: 2018-10-19

## 2018-10-16 RX ORDER — PROPOFOL 10 MG/ML
INJECTION, EMULSION INTRAVENOUS AS NEEDED
Status: DISCONTINUED | OUTPATIENT
Start: 2018-10-16 | End: 2018-10-16 | Stop reason: HOSPADM

## 2018-10-16 RX ORDER — SODIUM CHLORIDE 0.9 % (FLUSH) 0.9 %
5-10 SYRINGE (ML) INJECTION EVERY 8 HOURS
Status: DISCONTINUED | OUTPATIENT
Start: 2018-10-16 | End: 2018-10-19 | Stop reason: HOSPADM

## 2018-10-16 RX ORDER — FLUTICASONE PROPIONATE 50 MCG
2 SPRAY, SUSPENSION (ML) NASAL DAILY
Status: DISCONTINUED | OUTPATIENT
Start: 2018-10-17 | End: 2018-10-19 | Stop reason: HOSPADM

## 2018-10-16 RX ORDER — DIPHENHYDRAMINE HYDROCHLORIDE 50 MG/ML
12.5 INJECTION, SOLUTION INTRAMUSCULAR; INTRAVENOUS AS NEEDED
Status: DISCONTINUED | OUTPATIENT
Start: 2018-10-16 | End: 2018-10-16 | Stop reason: HOSPADM

## 2018-10-16 RX ORDER — SODIUM CHLORIDE 9 MG/ML
25 INJECTION, SOLUTION INTRAVENOUS CONTINUOUS
Status: DISCONTINUED | OUTPATIENT
Start: 2018-10-16 | End: 2018-10-16 | Stop reason: HOSPADM

## 2018-10-16 RX ORDER — OXYCODONE AND ACETAMINOPHEN 5; 325 MG/1; MG/1
1 TABLET ORAL AS NEEDED
Status: DISCONTINUED | OUTPATIENT
Start: 2018-10-16 | End: 2018-10-16 | Stop reason: HOSPADM

## 2018-10-16 RX ORDER — SODIUM CHLORIDE 0.9 % (FLUSH) 0.9 %
5-10 SYRINGE (ML) INJECTION AS NEEDED
Status: DISCONTINUED | OUTPATIENT
Start: 2018-10-16 | End: 2018-10-19 | Stop reason: HOSPADM

## 2018-10-16 RX ORDER — NALOXONE HYDROCHLORIDE 0.4 MG/ML
0.4 INJECTION, SOLUTION INTRAMUSCULAR; INTRAVENOUS; SUBCUTANEOUS AS NEEDED
Status: DISCONTINUED | OUTPATIENT
Start: 2018-10-16 | End: 2018-10-19 | Stop reason: HOSPADM

## 2018-10-16 RX ORDER — GLYCOPYRROLATE 0.2 MG/ML
INJECTION INTRAMUSCULAR; INTRAVENOUS AS NEEDED
Status: DISCONTINUED | OUTPATIENT
Start: 2018-10-16 | End: 2018-10-16 | Stop reason: HOSPADM

## 2018-10-16 RX ORDER — PHENYLEPHRINE HCL IN 0.9% NACL 0.4MG/10ML
SYRINGE (ML) INTRAVENOUS AS NEEDED
Status: DISCONTINUED | OUTPATIENT
Start: 2018-10-16 | End: 2018-10-16 | Stop reason: HOSPADM

## 2018-10-16 RX ORDER — HEPARIN SODIUM 5000 [USP'U]/ML
5000 INJECTION, SOLUTION INTRAVENOUS; SUBCUTANEOUS ONCE
Status: COMPLETED | OUTPATIENT
Start: 2018-10-16 | End: 2018-10-16

## 2018-10-16 RX ORDER — ROCURONIUM BROMIDE 10 MG/ML
INJECTION, SOLUTION INTRAVENOUS AS NEEDED
Status: DISCONTINUED | OUTPATIENT
Start: 2018-10-16 | End: 2018-10-16 | Stop reason: HOSPADM

## 2018-10-16 RX ORDER — SODIUM CHLORIDE 0.9 % (FLUSH) 0.9 %
5-10 SYRINGE (ML) INJECTION EVERY 8 HOURS
Status: DISCONTINUED | OUTPATIENT
Start: 2018-10-16 | End: 2018-10-16 | Stop reason: HOSPADM

## 2018-10-16 RX ORDER — OXYBUTYNIN CHLORIDE 5 MG/1
5 TABLET ORAL
Status: DISCONTINUED | OUTPATIENT
Start: 2018-10-16 | End: 2018-10-19 | Stop reason: HOSPADM

## 2018-10-16 RX ORDER — FENTANYL CITRATE 50 UG/ML
INJECTION, SOLUTION INTRAMUSCULAR; INTRAVENOUS AS NEEDED
Status: DISCONTINUED | OUTPATIENT
Start: 2018-10-16 | End: 2018-10-16 | Stop reason: HOSPADM

## 2018-10-16 RX ORDER — SODIUM CHLORIDE, SODIUM LACTATE, POTASSIUM CHLORIDE, CALCIUM CHLORIDE 600; 310; 30; 20 MG/100ML; MG/100ML; MG/100ML; MG/100ML
INJECTION, SOLUTION INTRAVENOUS
Status: DISCONTINUED | OUTPATIENT
Start: 2018-10-16 | End: 2018-10-16

## 2018-10-16 RX ORDER — AMLODIPINE BESYLATE 5 MG/1
5 TABLET ORAL
Status: DISCONTINUED | OUTPATIENT
Start: 2018-10-17 | End: 2018-10-19 | Stop reason: HOSPADM

## 2018-10-16 RX ORDER — CEFAZOLIN SODIUM/WATER 2 G/20 ML
2 SYRINGE (ML) INTRAVENOUS ONCE
Status: COMPLETED | OUTPATIENT
Start: 2018-10-16 | End: 2018-10-16

## 2018-10-16 RX ORDER — BUPIVACAINE HYDROCHLORIDE 5 MG/ML
INJECTION, SOLUTION EPIDURAL; INTRACAUDAL AS NEEDED
Status: DISCONTINUED | OUTPATIENT
Start: 2018-10-16 | End: 2018-10-16 | Stop reason: HOSPADM

## 2018-10-16 RX ORDER — SODIUM CHLORIDE, SODIUM LACTATE, POTASSIUM CHLORIDE, CALCIUM CHLORIDE 600; 310; 30; 20 MG/100ML; MG/100ML; MG/100ML; MG/100ML
125 INJECTION, SOLUTION INTRAVENOUS CONTINUOUS
Status: DISCONTINUED | OUTPATIENT
Start: 2018-10-16 | End: 2018-10-16 | Stop reason: HOSPADM

## 2018-10-16 RX ORDER — MIDAZOLAM HYDROCHLORIDE 1 MG/ML
INJECTION, SOLUTION INTRAMUSCULAR; INTRAVENOUS AS NEEDED
Status: DISCONTINUED | OUTPATIENT
Start: 2018-10-16 | End: 2018-10-16 | Stop reason: HOSPADM

## 2018-10-16 RX ADMIN — FENTANYL CITRATE 50 MCG: 50 INJECTION, SOLUTION INTRAMUSCULAR; INTRAVENOUS at 14:01

## 2018-10-16 RX ADMIN — HYDROMORPHONE HYDROCHLORIDE 0.5 MG: 2 INJECTION, SOLUTION INTRAMUSCULAR; INTRAVENOUS; SUBCUTANEOUS at 15:33

## 2018-10-16 RX ADMIN — ROCURONIUM BROMIDE 5 MG: 10 INJECTION, SOLUTION INTRAVENOUS at 13:14

## 2018-10-16 RX ADMIN — NEOSTIGMINE METHYLSULFATE 2 MG: 1 INJECTION INTRAVENOUS at 15:43

## 2018-10-16 RX ADMIN — GLYCOPYRROLATE 0.2 MG: 0.2 INJECTION INTRAMUSCULAR; INTRAVENOUS at 15:43

## 2018-10-16 RX ADMIN — Medication 10 ML: at 21:15

## 2018-10-16 RX ADMIN — ROCURONIUM BROMIDE 10 MG: 10 INJECTION, SOLUTION INTRAVENOUS at 13:23

## 2018-10-16 RX ADMIN — SODIUM CHLORIDE, SODIUM LACTATE, POTASSIUM CHLORIDE, AND CALCIUM CHLORIDE 75 ML/HR: 600; 310; 30; 20 INJECTION, SOLUTION INTRAVENOUS at 16:42

## 2018-10-16 RX ADMIN — EPHEDRINE SULFATE 10 MG: 50 INJECTION, SOLUTION INTRAVENOUS at 13:19

## 2018-10-16 RX ADMIN — MIDAZOLAM HYDROCHLORIDE 2 MG: 1 INJECTION, SOLUTION INTRAMUSCULAR; INTRAVENOUS at 13:04

## 2018-10-16 RX ADMIN — ATROPA BELLADONNA AND OPIUM 1 SUPPOSITORY: 16.2; 6 SUPPOSITORY RECTAL at 17:10

## 2018-10-16 RX ADMIN — HEPARIN SODIUM 5000 UNITS: 5000 INJECTION, SOLUTION INTRAVENOUS; SUBCUTANEOUS at 12:33

## 2018-10-16 RX ADMIN — Medication 2 G: at 20:52

## 2018-10-16 RX ADMIN — HYDROMORPHONE HYDROCHLORIDE 0.5 MG: 2 INJECTION, SOLUTION INTRAMUSCULAR; INTRAVENOUS; SUBCUTANEOUS at 14:05

## 2018-10-16 RX ADMIN — Medication 2 G: at 13:21

## 2018-10-16 RX ADMIN — EPHEDRINE SULFATE 10 MG: 50 INJECTION, SOLUTION INTRAVENOUS at 15:50

## 2018-10-16 RX ADMIN — HYDROCODONE BITARTRATE AND ACETAMINOPHEN 1 TABLET: 7.5; 325 TABLET ORAL at 21:14

## 2018-10-16 RX ADMIN — ROCURONIUM BROMIDE 25 MG: 10 INJECTION, SOLUTION INTRAVENOUS at 13:20

## 2018-10-16 RX ADMIN — DEXAMETHASONE SODIUM PHOSPHATE 4 MG: 4 INJECTION, SOLUTION INTRA-ARTICULAR; INTRALESIONAL; INTRAMUSCULAR; INTRAVENOUS; SOFT TISSUE at 13:19

## 2018-10-16 RX ADMIN — Medication 80 MCG: at 13:40

## 2018-10-16 RX ADMIN — EPHEDRINE SULFATE 10 MG: 50 INJECTION, SOLUTION INTRAVENOUS at 13:37

## 2018-10-16 RX ADMIN — PROPOFOL 150 MG: 10 INJECTION, EMULSION INTRAVENOUS at 13:14

## 2018-10-16 RX ADMIN — ROCURONIUM BROMIDE 10 MG: 10 INJECTION, SOLUTION INTRAVENOUS at 14:38

## 2018-10-16 RX ADMIN — OXYBUTYNIN CHLORIDE 5 MG: 5 TABLET ORAL at 21:14

## 2018-10-16 RX ADMIN — FENTANYL CITRATE 100 MCG: 50 INJECTION, SOLUTION INTRAMUSCULAR; INTRAVENOUS at 13:14

## 2018-10-16 RX ADMIN — HYDROMORPHONE HYDROCHLORIDE 0.5 MG: 2 INJECTION, SOLUTION INTRAMUSCULAR; INTRAVENOUS; SUBCUTANEOUS at 13:55

## 2018-10-16 RX ADMIN — MORPHINE SULFATE 2 MG: 10 INJECTION INTRAVENOUS at 16:59

## 2018-10-16 RX ADMIN — EPHEDRINE SULFATE 10 MG: 50 INJECTION, SOLUTION INTRAVENOUS at 13:23

## 2018-10-16 RX ADMIN — MORPHINE SULFATE 2 MG: 10 INJECTION INTRAVENOUS at 16:54

## 2018-10-16 RX ADMIN — GLYCOPYRROLATE 0.4 MG: 0.2 INJECTION INTRAMUSCULAR; INTRAVENOUS at 13:27

## 2018-10-16 RX ADMIN — SODIUM CHLORIDE, SODIUM LACTATE, POTASSIUM CHLORIDE, AND CALCIUM CHLORIDE 75 ML/HR: 600; 310; 30; 20 INJECTION, SOLUTION INTRAVENOUS at 12:36

## 2018-10-16 RX ADMIN — ROCURONIUM BROMIDE 10 MG: 10 INJECTION, SOLUTION INTRAVENOUS at 14:00

## 2018-10-16 RX ADMIN — LIDOCAINE HYDROCHLORIDE 80 MG: 20 INJECTION, SOLUTION EPIDURAL; INFILTRATION; INTRACAUDAL; PERINEURAL at 13:14

## 2018-10-16 NOTE — ROUTINE PROCESS
Patient: Torito Armenta. MRN: [id number]  SSN: [id number] YOB: 1945  Age: 75 y.o. Sex: male Patient is status post Procedure(s): 
ROBOTIC ASSISTED SIMPLE PROSTATECTOMY, CYSTOSCOPY WITH GONSALES PLACEMENT. Surgeon(s) and Role: Yovani Viveros MD - Primary Local/Dose/Irrigation:  SEE MAR Peripheral IV 10/16/18 Right Hand (Active) Site Assessment Clean, dry, & intact 10/16/2018 12:35 PM  
Phlebitis Assessment 0 10/16/2018 12:35 PM  
Infiltration Assessment 0 10/16/2018 12:35 PM  
Dressing Status Clean, dry, & intact; New 10/16/2018 12:35 PM  
Dressing Type Tape;Transparent 10/16/2018 12:35 PM  
Hub Color/Line Status Green; Infusing 10/16/2018 12:35 PM  
Action Taken Open ports on tubing capped 10/16/2018 12:35 PM  
Alcohol Cap Used Yes 10/16/2018 12:35 PM  
      
[place] Drain 10/16/18 Right; Lower Abdomen (Active) Site Assessment Clean, dry, & intact 10/16/2018  3:26 PM  
Dressing Status Clean, dry, & intact 10/16/2018  3:26 PM  
Status Patent;Draining 10/16/2018  3:26 PM  
Drainage Color Serosanguinous 10/16/2018  3:26 PM  
  
Airway - Endotracheal Tube 10/16/18 Oral (Active) Dressing/Packing:  Wound Abdomen-DRESSING TYPE: Topical skin adhesive/glue;4 x 4 (4 TROCAR SITES & DRAIN SITE COVERED WITH 4X4 & TAPE) (10/16/18 7260) Splint/Cast:  ] Other:

## 2018-10-16 NOTE — PERIOP NOTES
Patient interviewed in holding area, identity and procedure verified. 1000 ml sterile water as irrigation. Family member (daughter) updated as to start of surgery.

## 2018-10-16 NOTE — H&P
Silvia Green is a 75year old male who presents today for \"I keep dribbling\". He returns for follow-up. BPH status post a distant TURP x2 Ortega Black) with chronic LUTS now on Tamsulosin and Finasteride Prior recurrent hematuria and urinary tract infection with family history of prostate cancer and elevated PSA. Oct 2016 - R Sarah Timmons 9 did cysto for [facility] while Marianojesus alberto Leggetts did knee replacement. February 2017 prostate ultrasound100 g Updatepatient with persisting slow stream and difficulties with urination. This is despite finasteride and tamsulosin. PLAN______________________________ BPHpersisting symptoms. Is failing medical therapy with finasteride and tamsulosin chronically. Consider intervention. Failed TURP ×2 previously with 100 g prostate. Consider suprapubic prostate removal by robotic approach. Patient is amenable understanding risks and benefits. The patient is to undergo robotic simple prostate. Risks, benefits, and alternatives were discussed. Risks/complications include (not limited to) bowel injury, rectal fistula, vascular injury, conversion to open, recurrence of disease, BNC, incontinence/urine leak, impotence, robot malfunction/conversion to open procedure. MI, stroke, death discussed. We will plan to do a CUG 1 week postoperatively to rule out extravasation/urine leak. Advised patient of need for robotic-assisted lap simple prostatectomy due to refractory BPH symptoms. Discussed risks, benefits, alternatives. Appropriate questions answered and informational handouts given if available. Patient states understanding and agreed to proceed. It is my judgment that the patient understands the treatment plan. Elevated PSA PSA 1.46 PAST MEDICAL HISTORY: 
 
Allergies: No known allergies. DENIES: Latex, Shellfish, X-Ray Dye, Iodine. Medications: DULOXETINE HCL 30 MG ORAL CAPSULE DELAYED RELEASE PARTICLES (DULOXETINE HCL) NORTRIPTYLINE HCL 25 MG ORAL CAPSULE (NORTRIPTYLINE HCL) FINASTERIDE 5 MG ORAL TABLET (FINASTERIDE) 1 tab PO Daily TYLENOL EXTRA STRENGTH 500 MG ORAL TABLET (ACETAMINOPHEN) as needed IBUPROFEN 200 MG ORAL TABLET (IBUPROFEN) as needed VITAMIN D3 1000 UNIT ORAL TABLET (CHOLECALCIFEROL) daily FLONASE ALLERGY RELIEF 50 MCG/ACT NASAL SUSPENSION (FLUTICASONE PROPIONATE) as needed AMLODIPINE BESYLATE 5 MG ORAL TABLET (AMLODIPINE BESYLATE) daily ASPIRIN 81 MG ORAL TABLET (ASPIRIN) 1 daily TAMSULOSIN HCL 0.4 MG ORAL CAPSULE (TAMSULOSIN HCL) 1-2  caps PO QHS MULTIVITAMINS ORAL CAPSULE (MULTIPLE VITAMIN) One daily HYDROCHLOROTHIAZIDE 25 MG ORAL TABLET (HYDROCHLOROTHIAZIDE) One daily Problems: Acquired bladder neck contracture (ICD-596.0) (AWF22-Q78.0) Gross hematuria (ICD-599.71) (ZBB25-A37.0) Familial history of prostate cancer (ICD-V16.42) (GEC33-S68.82) BPH without urinary obstruction (ICD-600.00) (MDY67-Y49.0) Elevated PSA (ICD-790.93) (SCA07-B51.2) Illnesses: DENIES: Heart Disease, Pacemaker/Defibrillator, Lung Disease, Diabetes, High Blood Pressure, Bowel Problems, Stroke/Seizure, Kidney Problems, Bleeding Problems, HIV, Hepatitis, or Cancer. Surgeries: Prostate Biopsy, Prostate Surgery, Joint Replacement Surgery, Cataract Surgery, and Vasectomy. Family History: Prostate Cancer. DENIES: Kidney cancer, Kidney disease, Kidney stones. Social History: Limited activity/hobbies. Retired. . Smoking status: former smoker. Does not drink alcohol. System Review: Admits to: Easy Bleeding. DENIES: Unexplained Weight Loss, Dry Eyes, Dry Mouth, Leg Swelling, Shortness of Breath, Constipation, Involuntary Urine Loss, Lower Extremity Weakness, Dry Skin, Difficulty Walking, Psychiatric Problems, Impaired Sex Drive, Rash. EXAMINATION: Vitals: Pulse: 61 BP: 150/68 Weight: 211 lbs Height: 6' 0\" BMI: 28.72 kg/m^2 Prepped and draped. Flexible cystoscopy. Urethra: WNL Prostate: Post TURP defect without jonathon bladder neck contracture Bladder: Cellular trabeculation consistent with chronic obstruction Retroflex: WNL 
UOs in normal position. Postcysto abx given. URINALYSIS from Voided specimen Urine Dip: pH: 6.0, Bld: Neg, LE: Neg, Nit: Neg, Prot: Neg, Ket: Neg, Gluc: Neg 
Urine Micro: WBC: 0, RBC: 0, Bacteria: 0 
 
PSA HISTORY 
1.73 ng/ml on 08/30/2017 
2.46 ng/ml on 02/20/2017 
3.90 ng/ml on 03/07/2016 IMPRESSION: 
 
1. ACQUIRED BLADDER NECK CONTRACTURE (HUY50-V33.0) 2. GROSS HEMATURIA (LRC64-J12.0) 3. FAMILIAL HISTORY OF PROSTATE CANCER (RHL39-E04.42) 4. BPH WITHOUT URINARY OBSTRUCTION (MIV17-S72.0) 5. ELEVATED PSA (YJC04-O99.2) cc: Genet Llamas Transcribed by Speech to Text Technology [employer] E&M Service, E&M Service, Urinalysis Microscopic

## 2018-10-16 NOTE — PERIOP NOTES
TRANSFER - OUT REPORT: 
 
Verbal report given to Shaylee(name) on Wan Elmore.  being transferred to 520(unit) for routine post - op Report consisted of patients Situation, Background, Assessment and  
Recommendations(SBAR). Time Pre op antibiotic given:1321 Anesthesia Stop time: 6966 Alcantar Present on Transfer to floor:yes Order for Alcantar on Chart:yes Discharge Prescriptions with Chart:no Information from the following report(s) Procedure Summary and Accordion was reviewed with the receiving nurse. Opportunity for questions and clarification was provided. Is the patient on 02? NO Is the patient on a monitor? NO Is the nurse transporting with the patient? NO Surgical Waiting Area notified of patient's transfer from PACU? YES The following personal items collected during your admission accompanied patient upon transfer:  
Dental Appliance:   
Vision:   
Hearing Aid:   
Jewelry: Jewelry: None Clothing: Clothing:  (clothing to PACU) Other Valuables: Other Valuables: None Valuables sent to safe:   
1 bag of clothing returned to pt

## 2018-10-16 NOTE — BRIEF OP NOTE
BRIEF OPERATIVE NOTE Date of Procedure: 10/16/2018 Preoperative Diagnosis: BPH Postoperative Diagnosis: BPH Procedure(s): 
ROBOTIC ASSISTED SIMPLE PROSTATECTOMY INTRAOPERATIVE CYSTOSCOPY WITH DIFFICULT GONSALES PLACEMENT Surgeon(s) and Role: Mickie Montes MD - Primary Surgical Assistant:  Christine Bowers Surgical Staff: 
Circ-1: Monae Dexter RN 
Circ-Relief: Beba Guzmán RN Physician Assistant: Snehal Mitchell PA-C Scrub Tech-1: Tanmay Fuentes Scrub Tech-Relief: Cheri Justin Event Time In Incision Start [id number] Incision Close Anesthesia: General  
Estimated Blood Loss: <50 Specimens:  
ID Type Source Tests Collected by Time Destination 1 : PROSTATE TISSUE Fresh Prostate  Randy Samaniego MD 10/16/2018 1506 Pathology Findings:  Enlarged prostate Complications: None Implants: * No implants in log *

## 2018-10-16 NOTE — ANESTHESIA POSTPROCEDURE EVALUATION
Post-Anesthesia Evaluation and Assessment Patient: Agueda Renee MRN: [id number]  SSN: [id number] YOB: 1945  Age: 75 y.o. Sex: male Cardiovascular Function/Vital Signs Visit Vitals  /59  Pulse 84  Temp 36.5 °C (97.7 °F)  Resp 10  
 Ht 6' 1\" (1.854 m)  Wt 95.3 kg (210 lb)  SpO2 96%  BMI 27.71 kg/m2 Patient is status post general anesthesia for Procedure(s): 
ROBOTIC ASSISTED SIMPLE PROSTATECTOMY, CYSTOSCOPY WITH GONSALES PLACEMENT. Nausea/Vomiting: None Postoperative hydration reviewed and adequate. Pain: 
Pain Scale 1: Numeric (0 - 10) (10/16/18 1219) Pain Intensity 1: 3 (10/16/18 1219) Managed Neurological Status:  
Neuro (WDL): Within Defined Limits (10/16/18 1214) At baseline Mental Status and Level of Consciousness: Arousable Pulmonary Status:  
O2 Device: Room air (10/16/18 1616) Adequate oxygenation and airway patent Complications related to anesthesia: None Post-anesthesia assessment completed. No concerns Signed By: Shameka Cheema MD   
 October 16, 2018

## 2018-10-16 NOTE — OP NOTES
OPERATIVE REPORT    PREOPERATIVE DIAGNOSIS: BPH  POSTOPERATIVE DIAGNOSIS: BPH  OPERATIVE PROCEDURE:   Intraoperative cystoscopy and difficult thibodeaux placement  Robotic-assisted laparoscopic simple prostatectomy  SURGEON: Roly Burgess MD  FINDINGS:  Big prostate necessitating intraop cysto for thibodeaux placement to begin procedure. ESTIMATED BLOOD LOSS:50 mL  SPECIMEN: Prostate tissue  DRAINS: An 24Fr 3 way Thibodeaux catheter and a 15Fr KAYLIE. ANESTHESIA: General ETT  COMPLICATIONS: None. INDICATIONS: Full preoperative discussion regarding prostate cancer and possible treatment options. Patient opts for robotic-assisted laparoscopic prostatectomy with the understanding of all included risks and benefits, as explained to him in a preoperative fashion (infection, bleeding, bowel or vascular injury, rectal injury/fistula, possible colostomy, need for secondary procedure). Informed consent was obtained. PROCEDURE: The patient was brought to the operating room, placed in the supine position. Time out was performed. General anesthesia was induced and preoperative antibiotics given. The patient was then placed in the dorsal lithotomy position, in our typical prostatectomy preoperative position. This included the lower extremities in Yellofins. Upper extremities tucked and a strap across his chest. All pressure points were padded. All extremities in neutral position, avoidance of compression of nerves, etc. At this point, the patient was then placed into full Trendelenburg position to ensure that there was no slipping or movement. He was then returned to a neutral positioning. After that was performed, the patient was then prepped and draped in a standard fashion, including the abdomen, groin, and genitalia. Attempted to place thibodeaux. Unable to do so with wire and 5 Fr open ended. Used flexible cystoscope to negotiate by enlarged benign appearing prostate tissue. Thibodeaux placed over wire without issue.     Then a supraumbilical incision was made, carried down through the subcutaneous tissue, and a Veress needle was utilized, placed into the intra-abdominal space, verified by aspiration, instillation, and drop test.  After this was performed, insufflation was then started. Initial opening pressures were less than 10. Once the abdomen was fully insufflated, we then placed a 8mm camera trocar through the supraumbilical incision after using a more lateral initial port given some difficulty with initial Veress placement. Upon lateral port placement and visualization there was some air in the retroperitoneum. Camera was placed through, checking to ensure there were no injuries to the bowel,mesentery, or vasculature. A typical robotic configuration with 2 right-sided robotic ports lateral to the umbilicus,  by approximately 8 cm each, and then on the left side a robotic port, and then more laterally a 10/12 assistant port. The bladder was incised transversely after inflating with saline to 250mL. Bladder inspection without tumor. An enlarged prostate was visualized. Both orifices orthotopic and marked for safety with point cautery lateral.    The transitional zone of the prostate was then enucleated using cautery with blunt dissection and identification of the transition zone. Upon circumferential dissection the entire central gland was removed. The deep (PZ) side was intact throughout without issue. No rectal injury noted. Hemostasis was checked and was excellent. The specimen(s) were bagged and placed aside. 3-0 VLOC anastomotic stitches were placed into the intra- abdominal cavity. Needle drivers were then utilized and posterior portion of our bladder was grasped with each stitch, and then several stitches were run from each side through the urethra and bladder closing the prostate fossa that had been created.  At this point, after throwing several stitches, the posterior plate (bladder to urethra) was approximated without difficulty. Stitches then run in a circumferential fashion from 6 to 12 o'clock position. This was performed most of the way, and then a switch was made to keep traction on the stitch that had just been run, and then utilizing the contralateral stitch and coming up the anastomosis in a circumferential fashion on the contralateral side. This was performed all the way up and around. Good mucosa-to-mucosa approximation was seen and completion of anastamosis. Replaced the catheter with a new 24Fr, 3way thibodeaux. The bladder was closed with a 0 VLOC suture in running fashion in 2 layers. A KAYLIE drain was placed through fourth arm port and into the intra-abdominal space, placed over the urethrovesical junction. No incidental bowel injury or vascular issue upon completion. Removed robotic ports under direct visualization to assess for abdominal wall bleeding. The robot was undocked. Leftmost assistant port was widened for removal of specimen(s). Fascia from the removal site was closed with a 1 PDS essentially in a running fashion. Marcaine was then placed in all incisions. Incisions were closed utilizing 4-0 Monocryl and/or Dermabond, and the KAYLIE drain was then secured into position with 3-0 nylon suture. The Thibodeaux catheter was secured. The specimen was examined intraoperatively. At this point, the patient was awoke and brought to the PACU.

## 2018-10-17 LAB
ANION GAP SERPL CALC-SCNC: 9 MMOL/L (ref 5–15)
BUN SERPL-MCNC: 15 MG/DL (ref 6–20)
BUN/CREAT SERPL: 19 (ref 12–20)
CALCIUM SERPL-MCNC: 8.3 MG/DL (ref 8.5–10.1)
CHLORIDE SERPL-SCNC: 102 MMOL/L (ref 97–108)
CO2 SERPL-SCNC: 25 MMOL/L (ref 21–32)
CREAT FLD-MCNC: 0.9 MG/DL
CREAT SERPL-MCNC: 0.78 MG/DL (ref 0.7–1.3)
GLUCOSE SERPL-MCNC: 128 MG/DL (ref 65–100)
HCT VFR BLD AUTO: 39.4 % (ref 36.6–50.3)
HGB BLD-MCNC: 13.1 G/DL (ref 12.1–17)
POTASSIUM SERPL-SCNC: 4.2 MMOL/L (ref 3.5–5.1)
SODIUM SERPL-SCNC: 136 MMOL/L (ref 136–145)
SPECIMEN SOURCE FLD: NORMAL

## 2018-10-17 PROCEDURE — 74011250636 HC RX REV CODE- 250/636: Performed by: UROLOGY

## 2018-10-17 PROCEDURE — 82570 ASSAY OF URINE CREATININE: CPT | Performed by: UROLOGY

## 2018-10-17 PROCEDURE — 80048 BASIC METABOLIC PNL TOTAL CA: CPT | Performed by: UROLOGY

## 2018-10-17 PROCEDURE — 65270000029 HC RM PRIVATE

## 2018-10-17 PROCEDURE — 85018 HEMOGLOBIN: CPT | Performed by: UROLOGY

## 2018-10-17 PROCEDURE — 74011250637 HC RX REV CODE- 250/637: Performed by: UROLOGY

## 2018-10-17 PROCEDURE — 36415 COLL VENOUS BLD VENIPUNCTURE: CPT | Performed by: UROLOGY

## 2018-10-17 RX ADMIN — HYDROCHLOROTHIAZIDE 25 MG: 25 TABLET ORAL at 07:43

## 2018-10-17 RX ADMIN — HYDROCHLOROTHIAZIDE 25 MG: 25 TABLET ORAL at 13:25

## 2018-10-17 RX ADMIN — HYDROCODONE BITARTRATE AND ACETAMINOPHEN 1 TABLET: 7.5; 325 TABLET ORAL at 21:35

## 2018-10-17 RX ADMIN — Medication 10 ML: at 14:00

## 2018-10-17 RX ADMIN — HYDROCODONE BITARTRATE AND ACETAMINOPHEN 1 TABLET: 7.5; 325 TABLET ORAL at 01:00

## 2018-10-17 RX ADMIN — HYDROCODONE BITARTRATE AND ACETAMINOPHEN 1 TABLET: 7.5; 325 TABLET ORAL at 13:26

## 2018-10-17 RX ADMIN — HYDROCODONE BITARTRATE AND ACETAMINOPHEN 1 TABLET: 7.5; 325 TABLET ORAL at 05:30

## 2018-10-17 RX ADMIN — OXYBUTYNIN CHLORIDE 5 MG: 5 TABLET ORAL at 05:30

## 2018-10-17 RX ADMIN — FLUTICASONE PROPIONATE 2 SPRAY: 50 SPRAY, METERED NASAL at 09:05

## 2018-10-17 RX ADMIN — HYDROCODONE BITARTRATE AND ACETAMINOPHEN 1 TABLET: 7.5; 325 TABLET ORAL at 09:14

## 2018-10-17 RX ADMIN — Medication 10 ML: at 21:36

## 2018-10-17 RX ADMIN — HYDROCODONE BITARTRATE AND ACETAMINOPHEN 1 TABLET: 7.5; 325 TABLET ORAL at 17:38

## 2018-10-17 RX ADMIN — SODIUM CHLORIDE, SODIUM LACTATE, POTASSIUM CHLORIDE, AND CALCIUM CHLORIDE 75 ML/HR: 600; 310; 30; 20 INJECTION, SOLUTION INTRAVENOUS at 09:05

## 2018-10-17 RX ADMIN — SODIUM CHLORIDE, SODIUM LACTATE, POTASSIUM CHLORIDE, AND CALCIUM CHLORIDE 75 ML/HR: 600; 310; 30; 20 INJECTION, SOLUTION INTRAVENOUS at 04:52

## 2018-10-17 RX ADMIN — Medication 2 G: at 05:12

## 2018-10-17 RX ADMIN — Medication 10 ML: at 05:03

## 2018-10-17 RX ADMIN — OXYBUTYNIN CHLORIDE 5 MG: 5 TABLET ORAL at 21:35

## 2018-10-17 RX ADMIN — AMLODIPINE BESYLATE 5 MG: 5 TABLET ORAL at 07:43

## 2018-10-17 RX ADMIN — OXYBUTYNIN CHLORIDE 5 MG: 5 TABLET ORAL at 12:16

## 2018-10-17 NOTE — PROGRESS NOTES
Urology Progress Note Admit Date:  10/16/2018 Admit Dx: BPH 
BPH (benign prostatic hyperplasia) SUBJECTIVE:  
 
Alisa Pop. is stable this morning Does complain of bladder sensitivity OBJECTIVE:  
 
Vitals: 
Patient Vitals for the past 8 hrs: 
 BP Temp Pulse Resp SpO2  
10/17/18 0822 116/63 97.9 °F (36.6 °C) (!) 59 16 94 % 10/17/18 0743 107/56  62    
10/17/18 0451 111/59 98.5 °F (36.9 °C) 85 14 92 % Intake and Output: 
  
Last shift 10/15 1901 - 10/17 0700 In: 2750 [P.O.:950; I.V.:1800] Out: 1240 [Urine:1200; Drains:10] Last 8 hours No intake/output data recorded. Drain Output (last 8hr): 
Kye-Holbrook Drain 10/16/18 Right; Lower Abdomen-Output (ml): 5 ml Physical Exam:  
Alert and oriented. No distress Abdomen soft and nontender Incisions with mild erythema. Dry and intact Labs: CBC:  
Lab Results Component Value Date/Time HGB 13.1 10/17/2018 05:04 AM  
 HCT 39.4 10/17/2018 05:04 AM  
 
BMP:  
Lab Results Component Value Date/Time BUN 15 10/17/2018 05:04 AM  
 Creatinine 0.78 10/17/2018 05:04 AM  
 
   
   
Assessment:  
 
Procedure(s): 
ROBOTIC ASSISTED SIMPLE PROSTATECTOMY, CYSTOSCOPY WITH GONSALES PLACEMENT Plan: Gonsales draining well. Urine still sanguinous but improving. KAYLIE with only mild drainage. Ok to D/C Advance diet. Continue PO fluids 
d Signed By: Tristan Matthews PA-C - October 17, 2018

## 2018-10-17 NOTE — PROGRESS NOTES
Reason for Admission:  BPH, s/p Prostatectomy RRAT Score: 8 /low Plan for utilizing home health:   Pt will discharge home with thibodeaux cath. He states he may need home health to teach and reinforce thibodeaux care. He has used  [facility] in past.   
                 
Likelihood of Readmission:  low Transition of Care Plan:  He will return home after discharge and f/u with PCP and Urology. Care Management Interventions PCP Verified by CM: Jerome Crocker MD) Last Visit to PCP: 10/10/18 Palliative Care Criteria Met (RRAT>21 & CHF Dx)?: No 
Mode of Transport at Discharge: (family) Discharge Durable Medical Equipment: No 
Physical Therapy Consult: No 
Occupational Therapy Consult: No 
Current Support Network: Lives Alone Confirm Follow Up Transport: Self(self or family) Plan discussed with Pt/Family/Caregiver: Yes Freedom of Choice Offered: Yes [employer] Information Provided?: No  
 
 
Pauly Garland RN ACM CRM

## 2018-10-17 NOTE — PROGRESS NOTES
Bedside shift change report given to [address] (oncoming nurse) by Jonh Herrera RN (offgoing nurse). Report included the following information SBAR, Kardex, Intake/Output, MAR and Recent Results.

## 2018-10-17 NOTE — PROGRESS NOTES
Walked x 1 lap in tamayo-tolerated well -urine bloody -irrigated x 1 with 30cc ns at pt request -felt he was clotted -flushed easily with good return- incentive 1800

## 2018-10-18 PROCEDURE — 74011250637 HC RX REV CODE- 250/637: Performed by: UROLOGY

## 2018-10-18 PROCEDURE — 65270000029 HC RM PRIVATE

## 2018-10-18 PROCEDURE — 74011250637 HC RX REV CODE- 250/637: Performed by: PHYSICIAN ASSISTANT

## 2018-10-18 RX ORDER — FACIAL-BODY WIPES
10 EACH TOPICAL DAILY PRN
Status: DISCONTINUED | OUTPATIENT
Start: 2018-10-18 | End: 2018-10-19 | Stop reason: SDUPTHER

## 2018-10-18 RX ADMIN — Medication 10 ML: at 22:18

## 2018-10-18 RX ADMIN — Medication 10 ML: at 06:00

## 2018-10-18 RX ADMIN — HYDROCHLOROTHIAZIDE 25 MG: 25 TABLET ORAL at 07:00

## 2018-10-18 RX ADMIN — BISACODYL 10 MG: 10 SUPPOSITORY RECTAL at 09:34

## 2018-10-18 RX ADMIN — HYDROCODONE BITARTRATE AND ACETAMINOPHEN 1 TABLET: 7.5; 325 TABLET ORAL at 01:30

## 2018-10-18 RX ADMIN — OXYBUTYNIN CHLORIDE 5 MG: 5 TABLET ORAL at 11:07

## 2018-10-18 RX ADMIN — AMLODIPINE BESYLATE 5 MG: 5 TABLET ORAL at 07:00

## 2018-10-18 RX ADMIN — HYDROCODONE BITARTRATE AND ACETAMINOPHEN 1 TABLET: 7.5; 325 TABLET ORAL at 19:57

## 2018-10-18 RX ADMIN — HYDROCODONE BITARTRATE AND ACETAMINOPHEN 1 TABLET: 7.5; 325 TABLET ORAL at 13:11

## 2018-10-18 RX ADMIN — HYDROCODONE BITARTRATE AND ACETAMINOPHEN 1 TABLET: 7.5; 325 TABLET ORAL at 09:30

## 2018-10-18 RX ADMIN — HYDROCHLOROTHIAZIDE 25 MG: 25 TABLET ORAL at 13:11

## 2018-10-18 RX ADMIN — HYDROCODONE BITARTRATE AND ACETAMINOPHEN 1 TABLET: 7.5; 325 TABLET ORAL at 06:12

## 2018-10-18 NOTE — PROGRESS NOTES
Urology Progress Note Active Problems: 
  BPH (benign prostatic hyperplasia) (10/16/2018) Admitting MD (and procedure) = Jori Cabrera MD - Procedure(s): 
ROBOTIC ASSISTED SIMPLE PROSTATECTOMY, [facility] Urologist: gage Primary care MD: Bekah Mac MD  - [phone] Code state: Prior 
 
________________________________________________________________________________________________________________________________________________________ASSESSMENT/PLAN:  
Doing well. Hematuria from catheter trauma. Irrigate PRN. Plan home tomorrow AM. 
 
 
____________________________________________________________________________ 
____________________________________________________________________________ Patient: Haris Dumont. MRN: [id number]  SSN: [id number] YOB: 1945 Age: 75 y.o. Sex: male Height: Height: 6' 1\" (185.4 cm) Weight: Weight: 95.3 kg (209 lb 16 oz) BMI: Body mass index is 27.71 kg/m². PCP: Bekah Mac, [address] [phone] Contact:  Primary Emergency Contact: Vannesa Johns, Home Phone: [phone]  
 
_____________________________________________________________________________ Hospital Day: 3 - Admitted 10/16/2018 10:52 AM by Jori Cabrera MD Surgeon(s): 
Jori Cabrera MD 2 Days Post-Op Procedure(s): 
ROBOTIC ASSISTED SIMPLE PROSTATECTOMY, CYSTOSCOPY WITH GONSALES PLACEMENT  
   
_____________________________________________________________________________ SUBJECTIVE: 
Daily Progress Note: 10/18/2018 11:40 AM 
 
Haris Julia. is 2 Days Post-Op Doing well. Pulled on catheter. Tolerating diet. Current Antimicrobial Therapy (168h ago, onward) None Diet: DIET REGULAR OBJECTIVE: 
Visit Vitals BP 94/52 (BP 1 Location: Right arm, BP Patient Position: Head of bed elevated (Comment degrees)) Pulse (!) 53 Temp 97.8 °F (36.6 °C) Resp 18 Ht 6' 1\" (1.854 m) Wt 95.3 kg (209 lb 16 oz) SpO2 94% BMI 27.71 kg/m² Temp (24hrs), Av.8 °F (36.6 °C), Min:97.6 °F (36.4 °C), Max:97.9 °F (36.6 °C) Intake and Output: DIET REGULAR 
10/16 1901 - 10/18 07 In: 2050 [P.O.:1450; I.V.:600] Out: 1314 [Urine:5200; Drains:10] 
10/18 07 - 10/18 190 In: 600 [P.O.:600] Out: - Physical Exam: Abd soft nt nd Alcantar flushes easily. NO clots. Lab/Data Review: No results found for this or any previous visit (from the past 24 hour(s)). Cultures: All Micro Results None Signed By: Casa Lazcano MD  
                      2018

## 2018-10-18 NOTE — PROGRESS NOTES
Spiritual Care Partner Volunteer visited patient in Room 520/01 on 10.18.18. Documented by: : Rev. Romayne Andrews. Charissa Candelario; [facility], to contact [phone] [address] call: [phone]

## 2018-10-18 NOTE — PROGRESS NOTES
Problem: Falls - Risk of 
Goal: *Absence of Falls Document Leo Bean Fall Risk and appropriate interventions in the flowsheet. Outcome: Progressing Towards Goal 
Fall Risk Interventions: 
  
 
  
 
Medication Interventions: Evaluate medications/consider consulting pharmacy Elimination Interventions: Call light in reach Problem: Discharge Planning Goal: *Discharge to safe environment Received patient from previous RN via bedside shift report. Patient is resting comfortably; A&Ox4. AVSS, NAD noted at this time. Full assessment into epic. Stat lock replaced, thibodeaux care provided. Patient updated on current POC- verbalized understanding. All safety precautions in place- safety maintained. Will continue to monitor.

## 2018-10-18 NOTE — PROGRESS NOTES
Patient is doing well and the plan is for patient to be discharged tomorrow home alone [place]. Independent and self care prior to admission. He will have a thibodeaux. No HH indicated at this time. he has used [facility] in the past.  Patient said he hopes he will be able to perform the thibodeaux care himself. If not he would like to use [facility]/   
 
Family  to transport home tomorrow. On of his children will provide transport home. Son is Sadiq Griffith [phone]/ daughter Bee Montejo  [phone] and Mortimer Deputy [phone]   All 3  are MPOA's. UPDATE 3:30 pm   Order for skilled nursing received. Patient would like to use [facility] [phone] as he has used the agency in the past.  Referral sent to the agency via [employer] with order attached. Accepted. Name and number placed on discharge instructions and patient advised to call agency if not contacted by noon the day after discharge to inquire as to the day and time of initial visit.

## 2018-10-18 NOTE — PROGRESS NOTES
Physical Therapy Screening: 
Services are not indicated at this time. An InBasket screening referral was triggered for physical therapy based on results obtained during the nursing admission assessment. The patients chart was reviewed and the patient is not appropriate for a skilled therapy evaluation at this time. Please consult physical therapy if any therapy needs arise. Thank you.  
 
Verónica Garrison, PT

## 2018-10-19 VITALS
DIASTOLIC BLOOD PRESSURE: 64 MMHG | RESPIRATION RATE: 16 BRPM | SYSTOLIC BLOOD PRESSURE: 112 MMHG | HEART RATE: 68 BPM | HEIGHT: 73 IN | OXYGEN SATURATION: 93 % | WEIGHT: 210 LBS | BODY MASS INDEX: 27.83 KG/M2 | TEMPERATURE: 98.1 F

## 2018-10-19 PROCEDURE — 74011250637 HC RX REV CODE- 250/637: Performed by: UROLOGY

## 2018-10-19 PROCEDURE — 74011250637 HC RX REV CODE- 250/637: Performed by: PHYSICIAN ASSISTANT

## 2018-10-19 RX ORDER — HYDROCODONE BITARTRATE AND ACETAMINOPHEN 7.5; 325 MG/1; MG/1
1 TABLET ORAL
Qty: 30 TAB | Refills: 0 | Status: SHIPPED | OUTPATIENT
Start: 2018-10-19 | End: 2020-03-05

## 2018-10-19 RX ORDER — FACIAL-BODY WIPES
10 EACH TOPICAL DAILY PRN
Status: DISCONTINUED | OUTPATIENT
Start: 2018-10-19 | End: 2018-10-19 | Stop reason: HOSPADM

## 2018-10-19 RX ORDER — HYDROCHLOROTHIAZIDE 25 MG/1
25 TABLET ORAL DAILY
Status: DISCONTINUED | OUTPATIENT
Start: 2018-10-20 | End: 2018-10-19 | Stop reason: HOSPADM

## 2018-10-19 RX ADMIN — HYDROCODONE BITARTRATE AND ACETAMINOPHEN 1 TABLET: 7.5; 325 TABLET ORAL at 03:09

## 2018-10-19 RX ADMIN — HYDROCHLOROTHIAZIDE 25 MG: 25 TABLET ORAL at 08:18

## 2018-10-19 RX ADMIN — HYDROCODONE BITARTRATE AND ACETAMINOPHEN 1 TABLET: 7.5; 325 TABLET ORAL at 11:52

## 2018-10-19 RX ADMIN — Medication 10 ML: at 08:20

## 2018-10-19 RX ADMIN — BISACODYL 10 MG: 10 SUPPOSITORY RECTAL at 11:03

## 2018-10-19 RX ADMIN — AMLODIPINE BESYLATE 5 MG: 5 TABLET ORAL at 08:18

## 2018-10-19 NOTE — PROGRESS NOTES
Patient being discharged today. . CM informed [facility] that patient was being discharged today.

## 2018-10-19 NOTE — PROGRESS NOTES
Urology Progress Note Admit Date:  10/16/2018 Admit Dx: BPH 
BPH (benign prostatic hyperplasia) SUBJECTIVE:  
 
Louetta Shearing. is stable this morning Still anxious about discharge. Overall pain controlled. Tolerating diet. OBJECTIVE:  
 
Vitals: 
Patient Vitals for the past 8 hrs: 
 BP Temp Pulse Resp SpO2  
10/19/18 0307 129/70 98.1 °F (36.7 °C) (!) 58 16 94 % Intake and Output: 
  
Last shift 10/17 1901 - 10/19 0700 In: 1080 [P.O.:1080] Out: 6250 [Urine:6250] Last 8 hours No intake/output data recorded. Drain Output (last 8hr): 
[REMOVED] Kye-Holbrook Drain 10/16/18 Right; Lower Abdomen-Output (ml): 5 ml Physical Exam:  
Alert and oriented Abdomen soft and nontender Incisions clean and dry Labs: CBC:  
Lab Results Component Value Date/Time HGB 13.1 10/17/2018 05:04 AM  
 HCT 39.4 10/17/2018 05:04 AM  
 
BMP:  
Lab Results Component Value Date/Time BUN 15 10/17/2018 05:04 AM  
 Creatinine 0.78 10/17/2018 05:04 AM  
 
   
   
Assessment:  
 
Procedure(s): 
ROBOTIC ASSISTED SIMPLE PROSTATECTOMY, CYSTOSCOPY WITH GONSALES PLACEMENT Plan:  
 
Continue OOB Encourage PO fluid intake. Discharge planning Signed By: Delvin Grimes PA-C - October 19, 2018

## 2018-10-19 NOTE — DISCHARGE INSTRUCTIONS
DISCHARGE SUMMARY from Nurse    PATIENT INSTRUCTIONS:    After general anesthesia or intravenous sedation, for 24 hours or while taking prescription Narcotics:  · Limit your activities  · Do not drive and operate hazardous machinery  · Do not make important personal or business decisions  · Do  not drink alcoholic beverages  · If you have not urinated within 8 hours after discharge, please contact your surgeon on call. Report the following to your surgeon:  · Excessive pain, swelling, redness or odor of or around the surgical area  · Temperature over 100.5  · Nausea and vomiting lasting longer than 4 hours or if unable to take medications  · Any signs of decreased circulation or nerve impairment to extremity: change in color, persistent  numbness, tingling, coldness or increase pain  · Any questions    What to do at Home:  Recommended activity: {discharge activity:41463}, ***    If you experience any of the following symptoms ***, please follow up with ***. *  Please give a list of your current medications to your Primary Care Provider. *  Please update this list whenever your medications are discontinued, doses are      changed, or new medications (including over-the-counter products) are added. *  Please carry medication information at all times in case of emergency situations. These are general instructions for a healthy lifestyle:    No smoking/ No tobacco products/ Avoid exposure to second hand smoke  Surgeon General's Warning:  Quitting smoking now greatly reduces serious risk to your health.     Obesity, smoking, and sedentary lifestyle greatly increases your risk for illness    A healthy diet, regular physical exercise & weight monitoring are important for maintaining a healthy lifestyle    You may be retaining fluid if you have a history of heart failure or if you experience any of the following symptoms:  Weight gain of 3 pounds or more overnight or 5 pounds in a week, increased swelling in our hands or feet or shortness of breath while lying flat in bed. Please call your doctor as soon as you notice any of these symptoms; do not wait until your next office visit. Recognize signs and symptoms of STROKE:    F-face looks uneven    A-arms unable to move or move unevenly    S-speech slurred or non-existent    T-time-call 911 as soon as signs and symptoms begin-DO NOT go       Back to bed or wait to see if you get better-TIME IS BRAIN. Warning Signs of HEART ATTACK     Call 911 if you have these symptoms:   Chest discomfort. Most heart attacks involve discomfort in the center of the chest that lasts more than a few minutes, or that goes away and comes back. It can feel like uncomfortable pressure, squeezing, fullness, or pain.  Discomfort in other areas of the upper body. Symptoms can include pain or discomfort in one or both arms, the back, neck, jaw, or stomach.  Shortness of breath with or without chest discomfort.  Other signs may include breaking out in a cold sweat, nausea, or lightheadedness. Don't wait more than five minutes to call 911 - MINUTES MATTER! Fast action can save your life. Calling 911 is almost always the fastest way to get lifesaving treatment. Emergency Medical Services staff can begin treatment when they arrive -- up to an hour sooner than if someone gets to the hospital by car. The discharge information has been reviewed with the {PATIENT PARENT GUARDIAN:08291}. The {PATIENT PARENT GUARDIAN:64709} verbalized understanding. Discharge medications reviewed with the {Dishcarge meds reviewed LEDI:93731} and appropriate educational materials and side effects teaching were provided.   ___________________________________________________________________________________________________________________________________    [facility] PROSTATECTOMY POSTOPERATIVE INSTRUCTIONS  Background  This procedure removes the entire prostate gland and seminal vesicles in order to treat prostate cancer. The expected outcomes and associated complications have been covered by Dr. Juan Black in consultation. The procedure is inpatient (expected stay is 1-2 days) and you can usually begin working within 3-4 weeks of the procedure. Preparation before procedure  Bowel preparation as outlined (clear fluids only the day prior to surgery). Nothing by mouth starting at [address] on the day of your scheduled procedure. Stop medications as discussed with Dr. Juan Black (specifically anticoagulant medications). Continue all others unless you are told otherwise.  ___________  Followup Visit (Usually 1 week after surgery)  Your surgeon or his associate will see you in the office 1 week after surgery. At that time, your final pathology report will be reviewed with you. Your Alcantar (bladder) catheter should be removed at that time unless otherwise directed. You will be re?educated on male kegel exercises to strengthen your pelvic muscles. Virtually, everyone has some amount leakage of urine upon removal of the catheter and recovery time is variable. Please be patient. Please bring an adult urinary pad (such as Depend Guards) with you for the catheter removal.    Medications  Normally, you are sent home with two prescriptions. Be sure to get these filled promptly after your discharge from the hospital/surgery center. One is for an antibiotic. Start this antibiotic the day before your catheter is scheduled to be removed. The other is for pain medication. Take when needed, according to prescription instructions. We recommend using Tylenol (acetaminophen) for pain first and use narcotic pain medication only as needed. A laxative is suggested to prevent constipation. Colace over-the-counter is effective. One tablet twice a day while using the narcotic prescription. Call our office if diarrhea or loose stools occur.   All regularly prescribed medications may be resumed upon discharge, except blood thinners (ex: Coumadin, aspirin, ibuprofen). If you take blood thinner medication and were not advised about resuming this drug at discharge, please contact a nurse or physician before resuming. Catheter/Drain Care  Infrequently, you may be sent home with an abdominal drain. You will be instructed how to use this device and drain it appropriately. You will be discharged with a urinary [facility]) catheter. Keep your Alcantar catheter below the level of your bladder at all times,  otherwise it may not drain properly. If the catheter stops draining, call us immediately to discuss. The leg bag can be fitted under your trousers for daytime use. The larger overnight bag  will hold more urine and is best reserved for bedtime use. Minimal care of this unit is required. Make sure there is slack on the catheter between  your penis and the drainage bag. This should not be on any tension. Empty the bag  when full. You may disconnect the urinary drainage bag when showering and let the  catheter drain freely. A topical antibiotic ointment or K? Y jelly applied to the catheter as inserts into the penis  will reduce discomfort from the catheter. This can be obtained over the counter at your  local pharmacy. Wound Care  Keep your incisions clean and dry. If your incision is on the underside of your penis, you should keep your penis up against your abdomen, held there by support underwear. Contact us if:  You notice excessive swelling or redness, red streaks, drainage from the incision, or extreme tenderness. You are unable to urinate or the urinary catheter does not drain. Your temperature reaches 101.5F or higher. You are having pain that is not being relieved by the medication prescribed for you. Swelling or warmth limited to one leg or arm.   Call the office if between 8:00am and 4:30pm.  Call overnight line at [phone] from 4:30pm through 8:00am.    Activity  Refrain from driving for 1 week or while using narcotics. No heavy lifting (>15lbs) or strenuous exercise for 6 weeks. Walking and low intensity activity is encouraged immediately once you feel up to it. You may return to work at your discretion, usually after 3-4 weeks. You can shower 48 hours after surgery. No baths or swimming for 2 weeks.     [facility]  Contact Information  Daytime 8AM - 4:30PM  [employer]  [phone] [place]  [phone]   After hours - [phone]

## 2018-10-22 NOTE — DISCHARGE SUMMARY
Urology Discharge Summary    Patient: Eyal Reyes MRN: [id number]  SSN: [id number]    YOB: 1945  Age: 75 y.o. Sex: male               ADMISSION:  to No att. providers found by Kathleen Segura MD  10/16/2018 ADMISSION DIAGNOSIS: BPH  BPH (benign prostatic hyperplasia)  10/19/2018 DISCHARGE DIAGNOSIS: [x]    Same  CONSULTS: None  PROCEDURES: POD# 3 Days Post-Op Procedure(s):  ROBOTIC ASSISTED SIMPLE PROSTATECTOMY, CYSTOSCOPY WITH GONSALES PLACEMENT    RECENT LABS: [unfilled]     HOSPITAL COURSE: [x]    Uncomplicated. CONDITION AT DISCHARGE: stable  COMPLICATIONS: [x]    None identified  DISCHARGE TO:     [x]    Home  []    Rehab []    SNF  FOLLOWUP: one week  DISCHARGE MEDS:     [x]    IT IS INTENDED THAT YOU CONTINUE TO TAKE YOUR PRIOR MEDICATIONS WITHOUT CHANGES WITH THE EXCEPTION OF:  []    NONE    Discharge Medication List as of 10/19/2018  1:52 PM      START taking these medications    Details   HYDROcodone-acetaminophen (NORCO) 7.5-325 mg per tablet Take 1 Tab by mouth every four (4) hours as needed. Max Daily Amount: 6 Tabs., Print, Disp-30 Tab, R-0      docusate sodium (COLACE) 50 mg capsule Take 1 Cap by mouth two (2) times a day for 90 days. , Print, Disp-60 Cap, R-2         CONTINUE these medications which have NOT CHANGED    Details   cholecalciferol (VITAMIN D3) 1,000 unit cap Take 1,000 Units by mouth daily. , Historical Med      fluticasone (FLONASE) 50 mcg/actuation nasal spray 2 Sprays by Both Nostrils route as needed., Historical Med      amLODIPine (NORVASC) 5 mg tablet Take 5 mg by mouth every morning.  Indications: HYPERTENSION, Historical Med      hydrochlorothiazide (HYDRODIURIL) 25 mg tablet Take 25 mg by mouth two (2) times a day., Historical Med         STOP taking these medications       omega 3-DHA-EPA-fish oil (FISH OIL) 1,000 mg (120 mg-180 mg) capsule Comments:   Reason for Stopping:         aspirin delayed-release 81 mg tablet Comments:   Reason for Stopping: finasteride (PROSCAR) 5 mg tablet Comments:   Reason for Stopping:         tamsulosin (FLOMAX) 0.4 mg capsule Comments:   Reason for Stopping:                 Jennifer Tolbert PA-C 10/22/2018 2:16 PM

## 2019-04-29 ENCOUNTER — HOSPITAL ENCOUNTER (OUTPATIENT)
Dept: CT IMAGING | Age: 74
Discharge: HOME | End: 2019-04-29
Payer: MEDICARE

## 2019-04-29 DIAGNOSIS — Z87.891 PERSONAL HISTORY OF NICOTINE DEPENDENCE: ICD-10-CM

## 2019-04-29 PROCEDURE — G0297 LDCT FOR LUNG CA SCREEN: HCPCS

## 2019-11-19 ENCOUNTER — HOSPITAL ENCOUNTER (OUTPATIENT)
Dept: CT IMAGING | Age: 74
Discharge: HOME | End: 2019-11-19
Payer: MEDICARE

## 2019-11-19 DIAGNOSIS — Z87.891 PERSONAL HISTORY OF TOBACCO USE: ICD-10-CM

## 2019-11-19 PROCEDURE — G0297 LDCT FOR LUNG CA SCREEN: HCPCS

## 2019-12-04 ENCOUNTER — HOSPITAL ENCOUNTER (OUTPATIENT)
Dept: MRI IMAGING | Age: 74
Discharge: HOME | End: 2019-12-04
Attending: PHYSICAL MEDICINE & REHABILITATION
Payer: MEDICARE

## 2019-12-04 DIAGNOSIS — M51.36 DDD (DEGENERATIVE DISC DISEASE), LUMBAR: ICD-10-CM

## 2019-12-04 DIAGNOSIS — M54.42 ACUTE BACK PAIN WITH SCIATICA, LEFT: ICD-10-CM

## 2019-12-04 PROCEDURE — 72148 MRI LUMBAR SPINE W/O DYE: CPT

## 2020-03-05 RX ORDER — DULOXETIN HYDROCHLORIDE 30 MG/1
30 CAPSULE, DELAYED RELEASE ORAL
COMMUNITY
End: 2022-10-28

## 2020-03-05 RX ORDER — ATORVASTATIN CALCIUM 20 MG/1
20 TABLET, FILM COATED ORAL DAILY
COMMUNITY

## 2020-03-05 NOTE — PERIOP NOTES
[facility]  Ambulatory Surgery Unit  Pre-operative Instructions    Procedure Date  Monday, March 9, 2020            Tentative Arrival Time 1100      1. On the day of your procedure, please report to the Ambulatory Surgery Unit Registration Desk and sign in at your designated time. The Ambulatory Surgery Unit is located in [facility] on the [facility] side of the [facility] across from the [address] [place] [address]. Please have all of your health insurance cards and a photo ID. 2. You must have someone with you to drive you home as directed by your surgeon. 3. You may have a light breakfast and take normal morning medications. 4. We recommend you do not drink any alcoholic beverages for 24 hours before and after your procedure. 5. Contact your surgeons office for instructions on the following medications: non-steroidal anti-inflammatory drugs (i.e. Advil, Aleve), vitamins, and supplements. (Some surgeons will want you to stop these medications prior to surgery and others may allow you to take them)   **If you are currently taking Plavix, Coumadin, Aspirin and/or other blood-thinning agents, contact your surgeon for instructions. ** Your surgeon will partner with the physician prescribing these medications to determine if it is safe to stop or if you need to continue taking. Please do not stop taking these medications without instructions from your surgeon. 6. In an effort to help prevent surgical site infection, we ask that you shower with an anti-bacterial soap (i.e. Dial or Safeguard) on the morning of your procedure. Do not apply any lotions, powders, or deodorants after showering. 7. Wear comfortable clothes. Wear glasses instead of contacts. Do not bring any jewelry or money (other than copays or fees as instructed). Do not wear make-up, particularly mascara, the morning of your procedure. Wear your hair loose or down, no ponytails, buns, min pins or clips.  All body piercings must be removed. 8. You should understand that if you do not follow these instructions your procedure may be cancelled. If your physical condition changes (i.e. fever, cold or flu) please contact your surgeon as soon as possible. 9. It is important that you be on time. If a situation occurs where you may be late, or if you have any questions or problems, please call [phone].    10. Your procedure time may be subject to change. You will receive a phone call the day prior to confirm your arrival time. I understand a pre-operative phone call will be made to verify my procedure time. In the event that I am not available, I give permission for a message to be left on my answering service and/or with another person?       yes    Preop instructions reviewed  Pt verbalized understanding.      ___________________      ___________________      ___________________  (Signature of Patient)          (Witness)                   (Date and Time)

## 2020-03-09 ENCOUNTER — HOSPITAL ENCOUNTER (OUTPATIENT)
Age: 75
Setting detail: OUTPATIENT SURGERY
Discharge: HOME | End: 2020-03-09
Attending: OPHTHALMOLOGY | Admitting: OPHTHALMOLOGY
Payer: MEDICARE

## 2020-03-09 VITALS
OXYGEN SATURATION: 95 % | BODY MASS INDEX: 28.44 KG/M2 | HEART RATE: 63 BPM | SYSTOLIC BLOOD PRESSURE: 105 MMHG | WEIGHT: 210 LBS | DIASTOLIC BLOOD PRESSURE: 90 MMHG | TEMPERATURE: 97.8 F | HEIGHT: 72 IN | RESPIRATION RATE: 18 BRPM

## 2020-03-09 PROCEDURE — 76030000017 HC AMB SURG FIRST 0.5 HR INTENSV-TIER 1: Performed by: OPHTHALMOLOGY

## 2020-03-09 PROCEDURE — 74011000250 HC RX REV CODE- 250: Performed by: OPHTHALMOLOGY

## 2020-03-09 PROCEDURE — 74011000250 HC RX REV CODE- 250

## 2020-03-09 RX ORDER — TROPICAMIDE 10 MG/ML
1 SOLUTION/ DROPS OPHTHALMIC
Status: COMPLETED | OUTPATIENT
Start: 2020-03-09 | End: 2020-03-09

## 2020-03-09 RX ORDER — TETRACAINE HYDROCHLORIDE 5 MG/ML
SOLUTION OPHTHALMIC
Status: COMPLETED
Start: 2020-03-09 | End: 2020-03-09

## 2020-03-09 RX ORDER — BRIMONIDINE TARTRATE 2 MG/ML
1 SOLUTION/ DROPS OPHTHALMIC
Status: COMPLETED | OUTPATIENT
Start: 2020-03-09 | End: 2020-03-09

## 2020-03-09 RX ORDER — TROPICAMIDE 10 MG/ML
SOLUTION/ DROPS OPHTHALMIC
Status: COMPLETED
Start: 2020-03-09 | End: 2020-03-09

## 2020-03-09 RX ORDER — TETRACAINE HYDROCHLORIDE 5 MG/ML
1 SOLUTION OPHTHALMIC ONCE
Status: COMPLETED | OUTPATIENT
Start: 2020-03-09 | End: 2020-03-09

## 2020-03-09 RX ADMIN — BRIMONIDINE TARTRATE 1 DROP: 2 SOLUTION OPHTHALMIC at 12:49

## 2020-03-09 RX ADMIN — BRIMONIDINE TARTRATE 1 DROP: 2 SOLUTION OPHTHALMIC at 11:22

## 2020-03-09 RX ADMIN — BRIMONIDINE TARTRATE 1 DROP: 2 SOLUTION OPHTHALMIC at 11:18

## 2020-03-09 RX ADMIN — TROPICAMIDE 1 DROP: 10 SOLUTION/ DROPS OPHTHALMIC at 11:22

## 2020-03-09 RX ADMIN — TROPICAMIDE 1 DROP: 10 SOLUTION/ DROPS OPHTHALMIC at 11:18

## 2020-03-09 RX ADMIN — TETRACAINE HYDROCHLORIDE 1 DROP: 5 SOLUTION OPHTHALMIC at 12:45

## 2020-03-09 NOTE — OP NOTES
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 The patient was taken to the laser suite. Tetracaine was given topically to the right eye, and the patient was seated at the Yag laser.     The Yag laser was used to create a posterior capsulotomy with a total of:  61 impulses of the laser at 1.9 mJ power, 1 burst per impulse with posterior focusing. The superior anterior capsule was also treated with anterior focusing and that was included in the 61 count total.  A central opening was made in the posterior capsule. The lens implant was not affeted and remained well centered. One additional drop of brimonidine 0.2% was placed in the right eye. The patient tolerated the procedure well and is to follow-up in one week.

## 2020-03-09 NOTE — DISCHARGE INSTRUCTIONS
Patient to resume all home medications and activities. Post-op appt card to be given to patient by Dr. Janneth Chapa. No prescriptions. Wear sunglasses until pupil returns to normal size. Please call Dr. Arellano Im office with any questions at [phone].

## 2020-03-09 NOTE — BRIEF OP NOTE
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   Anesthesia: Topical   Estimated Blood Loss: none  Specimens: * No specimens in log *   Findings: secondary membrane right eye  Complications: none  Implants: * No implants in log *

## 2020-03-09 NOTE — H&P
Surgery History and Physcial    Subjective:      Talya Kaiser is a 67 y.o. male with secondary membrane / opacified capsule right eye for Yag laser capsulotomy right eye. Patient Active Problem List    Diagnosis Date Noted    BPH (benign prostatic hyperplasia) 10/16/2018    Localized primary osteoarthritis of lower leg 10/05/2016    Primary localized osteoarthritis of right knee 10/05/2016     Past Medical History:   Diagnosis Date    Adverse effect of anesthesia     DIFFICULT TO KEEP UNDER ANESTHESIA    Arthritis     Chronic pain     sciatic nerve pain, Left hip    Enlarged prostate     High cholesterol     Hypertension 2015      Past Surgical History:   Procedure Laterality Date    COLONOSCOPY N/A 3/14/2018    [facility]  3/14/2018         HX CATARACT REMOVAL Bilateral     HX KNEE ARTHROSCOPY Right     TOTAL RIGHT KNEE REPLACEMENT    HX MOHS PROCEDURES Left     HX PROSTATECTOMY      partial    HX ROTATOR CUFF REPAIR Left     HX TONSILLECTOMY      AS A CHILD    HX TURP      HX TURP      MS COLONOSCOPY FLX DX W/COLLJ SPEC WHEN PFRMD  7/3/2014         TOTAL HIP ARTHROPLASTY Left       Social History     Tobacco Use    Smoking status: Former Smoker     Packs/day: 1.50     Years: 40.00     Pack years: 60.00     Last attempt to quit:      Years since quittin.1    Smokeless tobacco: Never Used   Substance Use Topics    Alcohol use: Yes     Alcohol/week: 2.0 standard drinks     Types: 2 Glasses of wine per week     Comment: 2 PER MONTH      Family History   Problem Relation Age of Onset    Lung Disease Mother     Cancer Father         PROSTATE    Alzheimer Father    [facility] Arthritis-osteo Sister     No Known Problems Brother     Alzheimer Other       Prior to Admission medications    Medication Sig Start Date End Date Taking?  Authorizing Provider   atorvastatin (LIPITOR) 20 mg tablet Take 20 mg by mouth daily. Yes Provider, Historical   DULoxetine (CYMBALTA) 30 mg capsule Take 30 mg by mouth nightly. Yes Provider, Historical   omega 3-dha-epa-fish oil (FISH OIL) 100-160-1,000 mg cap Take 1 Tab by mouth daily. Yes Provider, Historical   cholecalciferol (VITAMIN D3) 1,000 unit cap Take 1,000 Units by mouth daily. Yes Other, MD Pia   fluticasone (FLONASE) 50 mcg/actuation nasal spray 2 Sprays by Both Nostrils route as needed. Yes Provider, Historical   amLODIPine (NORVASC) 5 mg tablet Take 5 mg by mouth every morning. Indications: HYPERTENSION   Yes Provider, Historical   hydrochlorothiazide (HYDRODIURIL) 25 mg tablet Take 25 mg by mouth daily. Yes Provider, Historical     No Known Allergies      Review of Systems   All other systems reviewed and are negative. Objective:     Visit Vitals  Ht 6' (1.829 m)   Wt 95.3 kg (210 lb)   BMI 28.48 kg/m²       Physical Exam  Constitutional:       Appearance: Normal appearance. HENT:      Head: Normocephalic and atraumatic. Cardiovascular:      Heart sounds: Normal heart sounds. Pulmonary:      Breath sounds: Normal breath sounds. Abdominal:      General: Bowel sounds are normal.   Neurological:      Mental Status: He is alert and oriented to person, place, and time. Psychiatric:         Mood and Affect: Mood normal.         Imaging:      Lab Review:  No results found for this or any previous visit (from the past 24 hour(s)). Assessment:     Secondary membrane / opacified capsule right eye for Yag laser capsulotomy right eye. Plan:     Secondary membrane / opacified capsule right eye for Yag laser capsulotomy right eye.

## 2020-07-01 ENCOUNTER — HOSPITAL ENCOUNTER (OUTPATIENT)
Dept: MRI IMAGING | Age: 75
Discharge: HOME | End: 2020-07-01
Attending: PHYSICAL MEDICINE & REHABILITATION
Payer: MEDICARE

## 2020-07-01 DIAGNOSIS — M25.562 ACUTE PAIN OF LEFT KNEE: ICD-10-CM

## 2020-07-01 DIAGNOSIS — M17.12 PRIMARY OSTEOARTHRITIS OF LEFT KNEE: ICD-10-CM

## 2020-07-01 PROCEDURE — 73721 MRI JNT OF LWR EXTRE W/O DYE: CPT

## 2020-11-19 ENCOUNTER — TRANSCRIBE ORDER (OUTPATIENT)
Dept: SCHEDULING | Age: 75
End: 2020-11-19

## 2020-11-19 DIAGNOSIS — Z87.891 FORMER SMOKER: Primary | ICD-10-CM

## 2020-11-27 ENCOUNTER — HOSPITAL ENCOUNTER (OUTPATIENT)
Dept: CT IMAGING | Age: 75
Discharge: HOME | End: 2020-11-27
Payer: MEDICARE

## 2020-11-27 DIAGNOSIS — Z87.891 FORMER SMOKER: ICD-10-CM

## 2020-11-27 PROCEDURE — G0297 LDCT FOR LUNG CA SCREEN: HCPCS

## 2021-11-09 ENCOUNTER — TRANSCRIBE ORDER (OUTPATIENT)
Dept: SCHEDULING | Age: 76
End: 2021-11-09

## 2021-11-09 DIAGNOSIS — R93.89 ABNORMAL RADIOLOGICAL FINDINGS IN SKIN AND SUBCUTANEOUS TISSUE: Primary | ICD-10-CM

## 2021-12-08 ENCOUNTER — HOSPITAL ENCOUNTER (OUTPATIENT)
Dept: CT IMAGING | Age: 76
Discharge: HOME | End: 2021-12-08
Attending: PHYSICIAN ASSISTANT
Payer: MEDICARE

## 2021-12-08 DIAGNOSIS — R93.89 ABNORMAL RADIOLOGICAL FINDINGS IN SKIN AND SUBCUTANEOUS TISSUE: ICD-10-CM

## 2021-12-08 PROCEDURE — 71271 CT THORAX LUNG CANCER SCR C-: CPT

## 2022-01-12 ENCOUNTER — TRANSCRIBE ORDER (OUTPATIENT)
Dept: SCHEDULING | Age: 77
End: 2022-01-12

## 2022-01-12 DIAGNOSIS — K80.20 CHOLELITHIASIS: ICD-10-CM

## 2022-01-12 DIAGNOSIS — R10.13 DYSPEPSIA: ICD-10-CM

## 2022-01-12 DIAGNOSIS — K62.5 RECTAL BLEEDING: Primary | ICD-10-CM

## 2022-01-25 ENCOUNTER — HOSPITAL ENCOUNTER (OUTPATIENT)
Dept: NUCLEAR MEDICINE | Age: 77
Discharge: HOME | End: 2022-01-25
Payer: MEDICARE

## 2022-01-25 VITALS — BODY MASS INDEX: 28.48 KG/M2 | WEIGHT: 210 LBS

## 2022-01-25 DIAGNOSIS — K62.5 RECTAL BLEEDING: ICD-10-CM

## 2022-01-25 DIAGNOSIS — K80.20 CHOLELITHIASIS: ICD-10-CM

## 2022-01-25 DIAGNOSIS — R10.13 DYSPEPSIA: ICD-10-CM

## 2022-01-25 PROCEDURE — 74011250636 HC RX REV CODE- 250/636: Performed by: NURSE PRACTITIONER

## 2022-01-25 PROCEDURE — 78227 HEPATOBIL SYST IMAGE W/DRUG: CPT

## 2022-01-25 RX ORDER — KIT FOR THE PREPARATION OF TECHNETIUM TC 99M MEBROFENIN 45 MG/10ML
5 INJECTION, POWDER, LYOPHILIZED, FOR SOLUTION INTRAVENOUS
Status: COMPLETED | OUTPATIENT
Start: 2022-01-25 | End: 2022-01-25

## 2022-01-25 RX ADMIN — SINCALIDE 1.91 MCG: 5 INJECTION, POWDER, LYOPHILIZED, FOR SOLUTION INTRAVENOUS at 10:33

## 2022-01-25 RX ADMIN — KIT FOR THE PREPARATION OF TECHNETIUM TC 99M MEBROFENIN 5 MILLICURIE: 45 INJECTION, POWDER, LYOPHILIZED, FOR SOLUTION INTRAVENOUS at 09:30

## 2022-03-19 PROBLEM — N40.0 BPH (BENIGN PROSTATIC HYPERPLASIA): Status: ACTIVE | Noted: 2018-10-16

## 2022-03-29 ENCOUNTER — ANESTHESIA EVENT (OUTPATIENT)
Dept: ENDOSCOPY | Age: 77
End: 2022-03-29
Payer: MEDICARE

## 2022-03-29 NOTE — ANESTHESIA PREPROCEDURE EVALUATION
Anesthetic History   No history of anesthetic complications       Comments: \"Difficult to keep under anesthesia\"     Review of Systems / Medical History  Patient summary reviewed, nursing notes reviewed and pertinent labs reviewed    Pulmonary          Smoker      Comments:  Former Smoker - Quit 2013 - 60 pack years   Neuro/Psych   Within defined limits           Cardiovascular    Hypertension        Dysrhythmias : PVC  Hyperlipidemia      Comments: ECG (10/2/18):   Sinus bradycardia with 1st degree AV block with occasional premature   ventricular complexes   Otherwise normal ECG   When compared with ECG of 26-SEP-2016 09:46,   premature ventricular complexes are now present   GI/Hepatic/Renal               Comments: Rectal bleeding  Cholelithiasis  Dyspepsia Endo/Other        Arthritis and cancer    Comments: Hx BPH s/p Prostatectomy (10/16/18)    Hx Right TKR (10/5/16)   Other Findings   Comments:  sciatic nerve pain, Left hip   Adverse effect of anesthesia (T41.45XA)  DIFFICULT TO KEEP UNDER ANESTHESIA           Physical Exam    Airway  Mallampati: II  TM Distance: > 6 cm  Neck ROM: normal range of motion   Mouth opening: Normal     Cardiovascular  Regular rate and rhythm,  S1 and S2 normal,  no murmur, click, rub, or gallop  Rhythm: regular  Rate: normal         Dental    Dentition: Caps/crowns, Upper dentition intact and Lower dentition intact  Comments: Some missing, denies any loose   Pulmonary  Breath sounds clear to auscultation               Abdominal  GI exam deferred       Other Findings            Anesthetic Plan    ASA: 2  Anesthesia type: MAC and total IV anesthesia          Induction: Intravenous  Anesthetic plan and risks discussed with: Patient

## 2022-03-31 ENCOUNTER — ANESTHESIA (OUTPATIENT)
Dept: ENDOSCOPY | Age: 77
End: 2022-03-31
Payer: MEDICARE

## 2022-03-31 ENCOUNTER — HOSPITAL ENCOUNTER (OUTPATIENT)
Age: 77
Setting detail: OUTPATIENT SURGERY
Discharge: HOME | End: 2022-03-31
Attending: INTERNAL MEDICINE | Admitting: INTERNAL MEDICINE
Payer: MEDICARE

## 2022-03-31 VITALS
HEART RATE: 57 BPM | DIASTOLIC BLOOD PRESSURE: 71 MMHG | TEMPERATURE: 98.3 F | HEIGHT: 72 IN | OXYGEN SATURATION: 99 % | WEIGHT: 205.9 LBS | RESPIRATION RATE: 15 BRPM | SYSTOLIC BLOOD PRESSURE: 144 MMHG | BODY MASS INDEX: 27.89 KG/M2

## 2022-03-31 PROCEDURE — 2709999900 HC NON-CHARGEABLE SUPPLY: Performed by: INTERNAL MEDICINE

## 2022-03-31 PROCEDURE — 74011250636 HC RX REV CODE- 250/636: Performed by: INTERNAL MEDICINE

## 2022-03-31 PROCEDURE — 74011250636 HC RX REV CODE- 250/636: Performed by: NURSE ANESTHETIST, CERTIFIED REGISTERED

## 2022-03-31 PROCEDURE — 76060000031 HC ANESTHESIA FIRST 0.5 HR: Performed by: INTERNAL MEDICINE

## 2022-03-31 PROCEDURE — 76040000019: Performed by: INTERNAL MEDICINE

## 2022-03-31 PROCEDURE — 74011000250 HC RX REV CODE- 250: Performed by: NURSE ANESTHETIST, CERTIFIED REGISTERED

## 2022-03-31 PROCEDURE — 77030013992 HC SNR POLYP ENDOSC BSC -B: Performed by: INTERNAL MEDICINE

## 2022-03-31 PROCEDURE — 88305 TISSUE EXAM BY PATHOLOGIST: CPT

## 2022-03-31 RX ORDER — ATROPINE SULFATE 0.1 MG/ML
0.5 INJECTION INTRAVENOUS
Status: DISCONTINUED | OUTPATIENT
Start: 2022-03-31 | End: 2022-03-31 | Stop reason: HOSPADM

## 2022-03-31 RX ORDER — FLUMAZENIL 0.1 MG/ML
0.2 INJECTION INTRAVENOUS
Status: DISCONTINUED | OUTPATIENT
Start: 2022-03-31 | End: 2022-03-31 | Stop reason: HOSPADM

## 2022-03-31 RX ORDER — NALOXONE HYDROCHLORIDE 0.4 MG/ML
0.4 INJECTION, SOLUTION INTRAMUSCULAR; INTRAVENOUS; SUBCUTANEOUS
Status: DISCONTINUED | OUTPATIENT
Start: 2022-03-31 | End: 2022-03-31 | Stop reason: HOSPADM

## 2022-03-31 RX ORDER — DEXTROMETHORPHAN/PSEUDOEPHED 2.5-7.5/.8
1.2 DROPS ORAL
Status: DISCONTINUED | OUTPATIENT
Start: 2022-03-31 | End: 2022-03-31 | Stop reason: HOSPADM

## 2022-03-31 RX ORDER — EPINEPHRINE 0.1 MG/ML
1 INJECTION INTRACARDIAC; INTRAVENOUS
Status: DISCONTINUED | OUTPATIENT
Start: 2022-03-31 | End: 2022-03-31 | Stop reason: HOSPADM

## 2022-03-31 RX ORDER — PROPOFOL 10 MG/ML
INJECTION, EMULSION INTRAVENOUS AS NEEDED
Status: DISCONTINUED | OUTPATIENT
Start: 2022-03-31 | End: 2022-03-31 | Stop reason: HOSPADM

## 2022-03-31 RX ORDER — SODIUM CHLORIDE 0.9 % (FLUSH) 0.9 %
5-40 SYRINGE (ML) INJECTION AS NEEDED
Status: DISCONTINUED | OUTPATIENT
Start: 2022-03-31 | End: 2022-03-31 | Stop reason: HOSPADM

## 2022-03-31 RX ORDER — LIDOCAINE HYDROCHLORIDE 20 MG/ML
INJECTION, SOLUTION EPIDURAL; INFILTRATION; INTRACAUDAL; PERINEURAL AS NEEDED
Status: DISCONTINUED | OUTPATIENT
Start: 2022-03-31 | End: 2022-03-31 | Stop reason: HOSPADM

## 2022-03-31 RX ORDER — FENTANYL CITRATE 50 UG/ML
25 INJECTION, SOLUTION INTRAMUSCULAR; INTRAVENOUS
Status: DISCONTINUED | OUTPATIENT
Start: 2022-03-31 | End: 2022-03-31 | Stop reason: HOSPADM

## 2022-03-31 RX ORDER — SODIUM CHLORIDE 0.9 % (FLUSH) 0.9 %
5-40 SYRINGE (ML) INJECTION EVERY 8 HOURS
Status: DISCONTINUED | OUTPATIENT
Start: 2022-03-31 | End: 2022-03-31 | Stop reason: HOSPADM

## 2022-03-31 RX ORDER — SODIUM CHLORIDE 9 MG/ML
75 INJECTION, SOLUTION INTRAVENOUS CONTINUOUS
Status: DISCONTINUED | OUTPATIENT
Start: 2022-03-31 | End: 2022-03-31 | Stop reason: HOSPADM

## 2022-03-31 RX ORDER — MIDAZOLAM HYDROCHLORIDE 1 MG/ML
.25-5 INJECTION, SOLUTION INTRAMUSCULAR; INTRAVENOUS
Status: DISCONTINUED | OUTPATIENT
Start: 2022-03-31 | End: 2022-03-31 | Stop reason: HOSPADM

## 2022-03-31 RX ADMIN — PROPOFOL 80 MG: 10 INJECTION, EMULSION INTRAVENOUS at 08:12

## 2022-03-31 RX ADMIN — PROPOFOL 30 MG: 10 INJECTION, EMULSION INTRAVENOUS at 08:16

## 2022-03-31 RX ADMIN — LIDOCAINE HYDROCHLORIDE 40 MG: 20 INJECTION, SOLUTION EPIDURAL; INFILTRATION; INTRACAUDAL; PERINEURAL at 08:09

## 2022-03-31 RX ADMIN — PROPOFOL 30 MG: 10 INJECTION, EMULSION INTRAVENOUS at 08:13

## 2022-03-31 RX ADMIN — PROPOFOL 30 MG: 10 INJECTION, EMULSION INTRAVENOUS at 08:23

## 2022-03-31 RX ADMIN — SODIUM CHLORIDE 75 ML/HR: 9 INJECTION, SOLUTION INTRAVENOUS at 08:00

## 2022-03-31 RX ADMIN — PROPOFOL 30 MG: 10 INJECTION, EMULSION INTRAVENOUS at 08:19

## 2022-03-31 NOTE — H&P
Gastroenterology Outpatient History and Physical    Patient: Francesca Easley. Physician: Kang Davis MD    Chief Complaint: rectal bleeding  History of Present Illness: 69yo M with rectal bleeding. Last colonoscopy 3/2018    History:  Past Medical History:   Diagnosis Date    Adverse effect of anesthesia     DIFFICULT TO KEEP UNDER ANESTHESIA    Arthritis     Chronic pain     sciatic nerve pain, Left hip    Enlarged prostate     High cholesterol     Hypertension 2015      Past Surgical History:   Procedure Laterality Date    COLONOSCOPY N/A 3/14/2018    [facility] ENDOSCOPY    COLONOSCOPY,DIAGNOSTIC  3/14/2018         HX CATARACT REMOVAL Bilateral     HX KNEE ARTHROSCOPY Right     TOTAL RIGHT KNEE REPLACEMENT    HX MOHS PROCEDURES Left 2006    HX PROSTATECTOMY      partial    HX ROTATOR CUFF REPAIR Left     HX TONSILLECTOMY      AS A CHILD    HX TURP      HX TURP      LA COLONOSCOPY FLX DX W/COLLJ SPEC WHEN PFRMD  7/3/2014         LA TOTAL HIP ARTHROPLASTY Left       Social History     Socioeconomic History    Marital status:    Tobacco Use    Smoking status: Former Smoker     Packs/day: 1.50     Years: 40.00     Pack years: 60.00     Quit date:      Years since quittin.2    Smokeless tobacco: Never Used   Substance and Sexual Activity    Alcohol use:  Yes     Alcohol/week: 2.0 standard drinks     Types: 2 Glasses of wine per week     Comment: 2 PER MONTH    Drug use: Not Currently      Family History   Problem Relation Age of Onset    Lung Disease Mother     Cancer Father         PROSTATE    Alzheimer's Disease Father     OSTEOARTHRITIS Sister     No Known Problems Brother     Alzheimer's Disease Other       Patient Active Problem List   Diagnosis Code    Localized primary osteoarthritis of lower leg M17.10    Primary localized osteoarthritis of right knee M17.11    BPH (benign prostatic hyperplasia) N40.0       Allergies: No Known Allergies  Medications:   Prior to Admission medications    Medication Sig Start Date End Date Taking? Authorizing Provider   atorvastatin (LIPITOR) 20 mg tablet Take 20 mg by mouth daily. Yes Provider, Historical   omega 3-dha-epa-fish oil (FISH OIL) 100-160-1,000 mg cap Take 1 Tab by mouth daily. Yes Provider, Historical   cholecalciferol (VITAMIN D3) 1,000 unit cap Take 1,000 Units by mouth daily. Yes Other, MD Pia   fluticasone (FLONASE) 50 mcg/actuation nasal spray 2 Sprays by Both Nostrils route as needed. Yes Provider, Historical   amLODIPine (NORVASC) 5 mg tablet Take 5 mg by mouth every morning. Indications: HYPERTENSION   Yes Provider, Historical   hydrochlorothiazide (HYDRODIURIL) 25 mg tablet Take 25 mg by mouth daily. Yes Provider, Historical   DULoxetine (CYMBALTA) 30 mg capsule Take 30 mg by mouth nightly. Patient not taking: Reported on 3/31/2022    Provider, Historical     Physical Exam:   Vital Signs: Blood pressure 135/71, pulse 71, temperature 98 °F (36.7 °C), resp. rate 16, height 6' (1.829 m), weight 93.4 kg (205 lb 14.4 oz), SpO2 97 %.   General: well developed, well nourished   HEENT: unremarkable   Heart: regular rhythm no mumur    Lungs: clear   Abdominal:  benign   Neurological: unremarkable   Extremities: no edema     Findings/Diagnosis: rectal bleeding  Plan of Care/Planned Procedure: colonoscopy with conscious/deep sedation    Signed:  Madeleine Zarco MD 3/31/2022

## 2022-03-31 NOTE — PERIOP NOTES
See anesthesia note and MAR for medications given during procedure. Received report from anesthesia staff on vital signs and status of patient.     Endoscope was pre-cleaned at the bedside immediately following procedure by LIBERTAD GORDON.

## 2022-03-31 NOTE — DISCHARGE INSTRUCTIONS
Toribio Enrique  [id number]  1945    COLON DISCHARGE INSTRUCTIONS  Discomfort:  Redness at IV site- apply warm compress to area; if redness or soreness persist- contact your physician  There may be a slight amount of blood passed from the rectum  Gaseous discomfort- walking, belching will help relieve any discomfort  You may not operate a vehicle for 12 hours  You may not engage in an occupation involving machinery or appliances for rest of today  You may not drink alcoholic beverages for at least 12 hours  Avoid making any critical decisions for at least 24 hour  DIET:   High fiber diet. - however -  remember your colon is empty and a heavy meal will produce gas. Avoid these foods:  vegetables, fried / greasy foods, carbonated drinks for today  MEDICATION:         ACTIVITY:  You may not resume your normal daily activities until tomorrow AM; it is recommended that you spend the remainder of the day resting -  avoid any strenuous activity. CALL M.D.   ANY SIGN OF:   Increasing pain, nausea, vomiting  Abdominal distension (swelling)  New increased bleeding (oral or rectal)  Fever (chills)  Pain in chest area  Bloody discharge from nose or mouth  Shortness of breath    IMPRESSION:  -Normal terminal ileum  -Single 4mm sessile polyp in the ascending colon at 85cm; removed with cold snare  -Rare shallow diverticula seen scattered throughout the colon  -No inflammation is identified    Follow-up Instructions:   Call Dr. Salvador Ledesma for the results of procedure / biopsy in 7-10 days  Telephone # [phone]  Repeat colonoscopy in 5 years    Allegra Lucia MD

## 2022-03-31 NOTE — ANESTHESIA POSTPROCEDURE EVALUATION
Procedure(s):  COLONOSCOPY WITH BIOPSY  ENDOSCOPIC POLYPECTOMY. MAC, total IV anesthesia    Anesthesia Post Evaluation        Patient location during evaluation: PACU  Note status: Adequate. Level of consciousness: responsive to verbal stimuli and sleepy but conscious  Pain management: satisfactory to patient  Airway patency: patent  Anesthetic complications: no  Cardiovascular status: acceptable  Respiratory status: acceptable  Hydration status: acceptable  Comments: +Post-Anesthesia Evaluation and Assessment    Patient: Reginald Murray MRN: [id number]  SSN: [id number]   YOB: 1945  Age: 65 y.o. Sex: male      Cardiovascular Function/Vital Signs    BP (!) 144/71   Pulse (!) 57   Temp 36.8 °C (98.3 °F)   Resp 15   Ht 6' (1.829 m)   Wt 93.4 kg (205 lb 14.4 oz)   SpO2 99%   BMI 27.93 kg/m²     Patient is status post Procedure(s):  COLONOSCOPY WITH BIOPSY  ENDOSCOPIC POLYPECTOMY. Nausea/Vomiting: Controlled. Postoperative hydration reviewed and adequate. Pain:  Pain Scale 1: Numeric (0 - 10) (03/31/22 0848)  Pain Intensity 1: 0 (03/31/22 0848)   Managed. Neurological Status: At baseline. Mental Status and Level of Consciousness: Arousable. Pulmonary Status:   O2 Device: None (Room air) (03/31/22 0848)   Adequate oxygenation and airway patent. Complications related to anesthesia: None    Post-anesthesia assessment completed. No concerns. Signed By: Alex Tse MD    3/31/2022  Post anesthesia nausea and vomiting:  controlled      INITIAL Post-op Vital signs:   Vitals Value Taken Time   /83 03/31/22 0851   Temp 36.8 °C (98.3 °F) 03/31/22 0832   Pulse 58 03/31/22 0854   Resp 15 03/31/22 0854   SpO2 99 % 03/31/22 0851   Vitals shown include unvalidated device data.

## 2022-03-31 NOTE — ROUTINE PROCESS
Haris Kim  1945  [id number]    Situation:  Verbal report received from: Self  Procedure: Procedure(s):  COLONOSCOPY WITH BIOPSY  ENDOSCOPIC POLYPECTOMY    Background:    Preoperative diagnosis: RECTAL BLEEDING, CHOLELITHIASIS, DYSPEPSIA  Postoperative diagnosis: Diverticulosis, hemorrhoids     :  Dr. Alta Mclaughlin  Assistant(s): Endoscopy Technician-1: Dexter Ritter  Endoscopy Technician-2: Kimberlyn Maldonado  Endoscopy RN-1: Cayla Velasco    Specimens:   ID Type Source Tests Collected by Time Destination   1 : Ascending colon polyp  Preservative Colon, Ascending  Haider Andrade MD 3/31/2022 0820 Pathology     H. Pylori  no    Assessment:  Intra-procedure medications     Anesthesia gave intra-procedure sedation and medications, see anesthesia flow sheet yes    Intravenous fluids: NS@ KVO     Vital signs stable     Abdominal assessment: round and soft     Recommendation:  Discharge patient per MD order.   Family   Permission to share finding with family or friend yes

## 2022-03-31 NOTE — PROCEDURES
NAME:  Jaxson Pastor. :   1945   MRN:   [id number]     Date/Time:  3/31/2022 8:31 AM    Colonoscopy Operative Report    Procedure Type:   Colonoscopy with polypectomy (cold snare)     Indications:     Rectal bleeding  Pre-operative Diagnosis: see indication above  Post-operative Diagnosis:  See findings below  :  Karl Doe MD  Referring Provider: Zhen Kramer MD    Exam:  Airway: clear, no airway problems anticipated  Heart: RRR, without gallops or rubs  Lungs: clear bilaterally without wheezes, crackles, or rhonchi  Abdomen: soft, nontender, nondistended, bowel sounds present  Mental Status: awake, alert and oriented to person, place and time    Sedation:  MAC anesthesia Propofol 200mg IV  Procedure Details:  After informed consent was obtained with all risks and benefits of procedure explained and preoperative exam completed, the patient was taken to the endoscopy suite and placed in the left lateral decubitus position. Upon sequential sedation as per above, a digital rectal exam was performed demonstrating internal hemorrhoids. The Olympus videocolonoscope  was inserted in the rectum and carefully advanced to the cecum, which was identified by the ileocecal valve and appendiceal orifice. The distal terminal ileum was evaluated. The quality of preparation was adequate. The colonoscope was slowly withdrawn with careful evaluation between folds. Retroflexion in the rectum was completed demonstrating internal hemorrhoids.      Findings:     -Normal terminal ileum  -Single 4mm sessile polyp in the ascending colon at 85cm; removed with cold snare  -Rare shallow diverticula seen scattered throughout the colon  -No inflammation is identified     Specimen Removed:  #1 asc colon polpy  Complications: None. EBL:  None.     Impression:    -Normal terminal ileum  -Single 4mm sessile polyp in the ascending colon at 85cm; removed with cold snare  -Rare shallow diverticula seen scattered throughout the colon  -No inflammation is identified    Recommendations: --Await pathology. , -Repeat colonoscopy in 5 years. High fiber diet. Resume normal medication(s). You will receive a letter about the biopsy results in about 10 days. You may be asked to call your doctor's office for the results. Discharge Disposition:  Home in the company of a  when able to ambulate.     June Paget, MD

## 2022-09-15 NOTE — IP AVS SNAPSHOT
[address]. 
[phone] Patient: Ottoniel Medina. MRN: [id number] YSS:2/75/9949 About your hospitalization You were admitted on:  March 14, 2018 You last received care in the:  [facility] ENDOSCOPY You were discharged on:  March 14, 2018 Why you were hospitalized Your primary diagnosis was:  Not on File Follow-up Information None Discharge Orders None A check darvin indicates which time of day the medication should be taken. My Medications CONTINUE taking these medications Instructions Each Dose to Equal  
 Morning Noon Evening Bedtime  
 acetaminophen 500 mg tablet Commonly known as:  TYLENOL Your last dose was: Your next dose is: Take 1,000 mg by mouth every six (6) hours as needed for Pain. 1000 mg  
    
   
   
   
  
 cholecalciferol 1,000 unit Cap Commonly known as:  VITAMIN D3 Your last dose was: Your next dose is: Take 1,000 Units by mouth daily. 1000 Units  
    
   
   
   
  
 finasteride 5 mg tablet Commonly known as:  PROSCAR Your last dose was: Your next dose is: Take 5 mg by mouth daily. 5 mg FLOMAX 0.4 mg capsule Generic drug:  tamsulosin Your last dose was: Your next dose is: Take 0.4 mg by mouth daily. 0.4 mg  
    
   
   
   
  
 FLONASE 50 mcg/actuation nasal spray Generic drug:  fluticasone Your last dose was: Your next dose is: 2 Sprays by Both Nostrils route as needed. 2 Spray  
    
   
   
   
  
 hydroCHLOROthiazide 25 mg tablet Commonly known as:  HYDRODIURIL Your last dose was: Your next dose is: Take 25 mg by mouth two (2) times a day. 25 mg  
    
   
   
   
  
 NORVASC 5 mg tablet Generic drug:  amLODIPine Your last dose was: Your next dose is: Take 5 mg by mouth every morning. Indications: HYPERTENSION  
 5 mg  
    
   
   
   
  
 oxyCODONE IR 5 mg immediate release tablet Commonly known as:  Hannah Hughes Your last dose was: Your next dose is: Take 1-2 Tabs by mouth every three (3) hours as needed. Max Daily Amount: 80 mg.  
 5-10 mg  
    
   
   
   
  
 pravastatin 20 mg tablet Commonly known as:  PRAVACHOL Your last dose was: Your next dose is: Take 40 mg by mouth nightly. 40 mg  
    
   
   
   
  
 pregabalin 50 mg capsule Commonly known as:  Nancie Daft Your last dose was: Your next dose is: Take 50 mg by mouth daily. 50 mg Discharge Instructions Roderick Sandhu. 
[id number] 
1945 COLON DISCHARGE INSTRUCTIONS Discomfort: 
Redness at IV site- apply warm compress to area; if redness or soreness persist- contact your physician There may be a slight amount of blood passed from the rectum Gaseous discomfort- walking, belching will help relieve any discomfort You may not operate a vehicle for 12 hours You may not engage in an occupation involving machinery or appliances for rest of today You may not drink alcoholic beverages for at least 12 hours Avoid making any critical decisions for at least 24 hour DIET: 
 High fiber diet.  however -  remember your colon is empty and a heavy meal will produce gas. Avoid these foods:  vegetables, fried / greasy foods, carbonated drinks for today MEDICATION: 
 
 
  
ACTIVITY: 
You may not resume your normal daily activities until tomorrow AM; it is recommended that you spend the remainder of the day resting -  avoid any strenuous activity. CALL M.D. ANY SIGN OF: Increasing pain, nausea, vomiting Abdominal distension (swelling) New increased bleeding (oral or rectal) Fever (chills) IMPRESSION: 
 -Normal terminal ileum 
-Rare shallow diverticula seen scattered throughout the colon 
-No masses, polyps, or inflammation are identified. Biopsies obtained in ascending colon to exclude microscopic colitis Follow-up Instructions: 
 Call Dr. Dawit Piedra for the results of procedure / biopsy in 7-10 days Telephone # [phone] No repeat colonoscopy indicated Girish Chun MD 
 
 
[employer] Activation Thank you for requesting access to [employer]. Please follow the instructions below to securely access and download your online medical record. [employer] allows you to send messages to your doctor, view your test results, renew your prescriptions, schedule appointments, and more. How Do I Sign Up? 1. In your internet browser, go to [email or web address] 
2. Click on the First Time User? Click Here link in the Sign In box. You will be redirect to the New Member Sign Up page. 3. Enter your [employer] Access Code exactly as it appears below. You will not need to use this code after youve completed the sign-up process. If you do not sign up before the expiration date, you must request a new code. [employer] Access Code: [email or web address] Expires: 2018  2:21 PM (This is the date your [employer] access code will ) 4. Enter the last four digits of your Social Security Number (xxxx) and Date of Birth (mm/dd/yyyy) as indicated and click Submit. You will be taken to the next sign-up page. 5. Create a [employer] ID. This will be your [employer] login ID and cannot be changed, so think of one that is secure and easy to remember. 6. Create a [employer] password. You can change your password at any time. 7. Enter your Password Reset Question and Answer. This can be used at a later time if you forget your password. 8. Enter your e-mail address. You will receive e-mail notification when new information is available in [address] 9. Click Sign Up. You can now view and download portions of your medical record. 10. Click the Download Summary menu link to download a portable copy of your medical information. Additional Information If you have questions, please visit the Frequently Asked Questions section of the [email or web address] website at [email or web address] Remember, [email or web address] is NOT to be used for urgent needs. For medical emergencies, dial 911. Introducing [facility]! [employer] introduces [email or web address] patient portal. Now you can access parts of your medical record, email your doctor's office, and request medication refills online. 1. In your internet browser, go to [email or web address] 2. Click on the First Time User? Click Here link in the Sign In box. You will see the New Member Sign Up page. 3. Enter your [employer] Access Code exactly as it appears below. You will not need to use this code after youve completed the sign-up process. If you do not sign up before the expiration date, you must request a new code. · [email or web address] Access Code: [email or web address] Expires: 6/12/2018  2:21 PM 
 
4. Enter the last four digits of your Social Security Number (xxxx) and Date of Birth (mm/dd/yyyy) as indicated and click Submit. You will be taken to the next sign-up page. 5. Create a [employer] ID. This will be your [employer] login ID and cannot be changed, so think of one that is secure and easy to remember. 6. Create a [employer] password. You can change your password at any time. 7. Enter your Password Reset Question and Answer. This can be used at a later time if you forget your password. 8. Enter your e-mail address. You will receive e-mail notification when new information is available in [address] 9. Click Sign Up. You can now view and download portions of your medical record. 10. Click the Download Summary menu link to download a portable copy of your medical information.  
 
If you have questions, please visit the Frequently Asked Questions section of the [employer]. Remember, MyChart is NOT to be used for urgent needs. For medical emergencies, dial 911. Now available from your iPhone and Android! Providers Seen During Your Hospitalization Provider Specialty Primary office phone Nadine Feng MD Gastroenterology [phone] Your Primary Care Physician (PCP) Primary Care Physician Office Phone Office Fax [phone] [phone] You are allergic to the following No active allergies Recent Documentation Height Weight BMI Smoking Status 1.854 m 90.7 kg 26.39 kg/m2 Former Smoker Emergency Contacts Name Discharge Info Relation Home Work Mobile HOSP DR. JUAN JOSE CHERRY DISCHARGE CAREGIVER [3] Daughter [21] [phone] Valentino Coy DISCHARGE CAREGIVER [3] Son [22] [phone] Patient Belongings The following personal items are in your possession at time of discharge: 
  Dental Appliances: None  Visual Aid: None Please provide this summary of care documentation to your next provider. Signatures-by signing, you are acknowledging that this After Visit Summary has been reviewed with you and you have received a copy. Patient Signature:  ____________________________________________________________ Date:  ____________________________________________________________  
  
Angelita Mealing Provider Signature:  ____________________________________________________________ Date:  ____________________________________________________________ Island Pedicle Flap Text: The defect edges were debeveled with a #15 scalpel blade.  Given the location of the defect, shape of the defect and the proximity to free margins an island pedicle advancement flap was deemed most appropriate.  Using a sterile surgical marker, an appropriate advancement flap was drawn incorporating the defect, outlining the appropriate donor tissue and placing the expected incisions within the relaxed skin tension lines where possible.    The area thus outlined was incised deep to adipose tissue with a #15 scalpel blade.  The skin margins were undermined to an appropriate distance in all directions around the primary defect and laterally outward around the island pedicle utilizing iris scissors.  There was minimal undermining beneath the pedicle flap.

## 2022-09-16 ENCOUNTER — TRANSCRIBE ORDER (OUTPATIENT)
Dept: SCHEDULING | Age: 77
End: 2022-09-16

## 2022-09-16 DIAGNOSIS — R91.8 PULMONARY NODULES: Primary | ICD-10-CM

## 2022-09-26 ENCOUNTER — OFFICE VISIT (OUTPATIENT)
Dept: SURGERY | Age: 77
End: 2022-09-26
Payer: MEDICARE

## 2022-09-26 VITALS
DIASTOLIC BLOOD PRESSURE: 65 MMHG | BODY MASS INDEX: 27.5 KG/M2 | TEMPERATURE: 98.3 F | HEIGHT: 72 IN | OXYGEN SATURATION: 94 % | SYSTOLIC BLOOD PRESSURE: 113 MMHG | HEART RATE: 94 BPM | WEIGHT: 203 LBS | RESPIRATION RATE: 20 BRPM

## 2022-09-26 DIAGNOSIS — K43.2 INCISIONAL HERNIA, WITHOUT OBSTRUCTION OR GANGRENE: Primary | ICD-10-CM

## 2022-09-26 PROCEDURE — 99204 OFFICE O/P NEW MOD 45 MIN: CPT | Performed by: SURGERY

## 2022-09-26 PROCEDURE — G8427 DOCREV CUR MEDS BY ELIG CLIN: HCPCS | Performed by: SURGERY

## 2022-09-26 PROCEDURE — 1123F ACP DISCUSS/DSCN MKR DOCD: CPT | Performed by: SURGERY

## 2022-09-26 PROCEDURE — G8432 DEP SCR NOT DOC, RNG: HCPCS | Performed by: SURGERY

## 2022-09-26 PROCEDURE — 1101F PT FALLS ASSESS-DOCD LE1/YR: CPT | Performed by: SURGERY

## 2022-09-26 PROCEDURE — G8536 NO DOC ELDER MAL SCRN: HCPCS | Performed by: SURGERY

## 2022-09-26 PROCEDURE — G8419 CALC BMI OUT NRM PARAM NOF/U: HCPCS | Performed by: SURGERY

## 2022-09-26 NOTE — Clinical Note
10/27/2022    Patient: Karla Mulligan. YOB: 1945   Date of Visit: 9/26/2022     Gregory Granados MD  [address]  [address] [address]  Via Fax: [address] LOTTIE MD  [facility]  [address]  [facility]  [facility]    Dear MD Maria Esther Teixeira MD,      Thank you for referring Mr. Brunilda Rod to [address] [place] [address] for evaluation. My notes for this consultation are attached. If you have questions, please do not hesitate to call me. I look forward to following your patient along with you.       Sincerely,    Maryuri Lyons MD

## 2022-09-26 NOTE — PROGRESS NOTES
Identified pt with two pt identifiers(name and ). Reviewed record in preparation for visit and have obtained necessary documentation. All patient medications has been reviewed. Chief Complaint   Patient presents with    Possible Hernia     Seen at the request of Dr. Fabienne hooper poss left abdominal hernia        Health Maintenance Due   Topic    Hepatitis C Screening     Depression Screen     COVID-19 Vaccine (1)    Lipid Screen     DTaP/Tdap/Td series (1 - Tdap)    Shingrix Vaccine Age 50> (1 of 2)    Pneumococcal 65+ years (1 - PCV)    Medicare Yearly Exam     Flu Vaccine (1)       Vitals:    22 1339   BP: 113/65   Pulse: 94   Resp: 20   Temp: 98.3 °F (36.8 °C)   SpO2: 94%   Weight: 92.1 kg (203 lb)   Height: 6' (1.829 m)   PainSc:   5   PainLoc: Generalized       4. Have you been to the ER, urgent care clinic since your last visit? Hospitalized since your last visit? No    5. Have you seen or consulted any other health care providers outside of the [address] [place] [address] since your last visit? Include any pap smears or colon screening. Yes When: 2022 Dr. Roberto Carlos Lott urology abd hernia      Patient is accompanied by self I have received verbal consent from Bessy Bailey to discuss any/all medical information while they are present in the room.

## 2022-10-28 ENCOUNTER — HOSPITAL ENCOUNTER (OUTPATIENT)
Dept: PREADMISSION TESTING | Age: 77
Discharge: HOME | End: 2022-10-28
Attending: SURGERY
Payer: MEDICARE

## 2022-10-28 VITALS
HEART RATE: 60 BPM | DIASTOLIC BLOOD PRESSURE: 56 MMHG | WEIGHT: 202.38 LBS | HEIGHT: 72 IN | BODY MASS INDEX: 27.41 KG/M2 | RESPIRATION RATE: 16 BRPM | TEMPERATURE: 97.8 F | OXYGEN SATURATION: 100 % | SYSTOLIC BLOOD PRESSURE: 124 MMHG

## 2022-10-28 LAB
ANION GAP SERPL CALC-SCNC: 4 MMOL/L (ref 5–15)
BUN SERPL-MCNC: 16 MG/DL (ref 6–20)
BUN/CREAT SERPL: 22 (ref 12–20)
CALCIUM SERPL-MCNC: 8.8 MG/DL (ref 8.5–10.1)
CHLORIDE SERPL-SCNC: 104 MMOL/L (ref 97–108)
CO2 SERPL-SCNC: 28 MMOL/L (ref 21–32)
CREAT SERPL-MCNC: 0.74 MG/DL (ref 0.7–1.3)
ERYTHROCYTE [DISTWIDTH] IN BLOOD BY AUTOMATED COUNT: 13.6 % (ref 11.5–14.5)
GLUCOSE SERPL-MCNC: 98 MG/DL (ref 65–100)
HCT VFR BLD AUTO: 43.7 % (ref 36.6–50.3)
HGB BLD-MCNC: 14.3 G/DL (ref 12.1–17)
MCH RBC QN AUTO: 28.4 PG (ref 26–34)
MCHC RBC AUTO-ENTMCNC: 32.7 G/DL (ref 30–36.5)
MCV RBC AUTO: 86.9 FL (ref 80–99)
NRBC # BLD: 0 K/UL (ref 0–0.01)
NRBC BLD-RTO: 0 PER 100 WBC
PLATELET # BLD AUTO: 185 K/UL (ref 150–400)
PMV BLD AUTO: 10.1 FL (ref 8.9–12.9)
POTASSIUM SERPL-SCNC: 3.6 MMOL/L (ref 3.5–5.1)
RBC # BLD AUTO: 5.03 M/UL (ref 4.1–5.7)
SODIUM SERPL-SCNC: 136 MMOL/L (ref 136–145)
WBC # BLD AUTO: 6.8 K/UL (ref 4.1–11.1)

## 2022-10-28 PROCEDURE — 36415 COLL VENOUS BLD VENIPUNCTURE: CPT

## 2022-10-28 PROCEDURE — 80048 BASIC METABOLIC PNL TOTAL CA: CPT

## 2022-10-28 PROCEDURE — 85027 COMPLETE CBC AUTOMATED: CPT

## 2022-10-28 RX ORDER — ERGOCALCIFEROL (VITAMIN D2) 10 MCG
1 TABLET ORAL DAILY
COMMUNITY

## 2022-10-28 RX ORDER — SODIUM CHLORIDE, SODIUM LACTATE, POTASSIUM CHLORIDE, CALCIUM CHLORIDE 600; 310; 30; 20 MG/100ML; MG/100ML; MG/100ML; MG/100ML
25 INJECTION, SOLUTION INTRAVENOUS CONTINUOUS
Status: CANCELLED | OUTPATIENT
Start: 2022-11-03

## 2022-10-28 NOTE — PERIOP NOTES
[facility]  Preoperative Instructions        Surgery Date 11/3/22         Time of Arrival to be called @ [phone]    1. On the day of your surgery, please report to the Surgical Services Registration Desk and sign in at your designated time. The Surgery Center is located to the right of the Emergency Room. 2. You must have someone with you to drive you home. You should not drive a car for 24 hours following surgery. Please make arrangements for a friend or family member to stay with you for the first 24 hours after your surgery. 3. Do not have anything to eat or drink (including water, gum, mints, coffee, juice) after midnight ?? .? This may not apply to medications prescribed by your physician. ?(Please note below the special instructions with medications to take the morning of your procedure.)    4. We recommend you do not drink any alcoholic beverages for 24 hours before and after your surgery. 5. Contact your surgeons office for instructions on the following medications: non-steroidal anti-inflammatory drugs (i.e. Advil, Aleve), vitamins, and supplements. (Some surgeons will want you to stop these medications prior to surgery and others may allow you to take them)  **If you are currently taking Plavix, Coumadin, Aspirin and/or other blood-thinning agents, contact your surgeon for instructions. ** Your surgeon will partner with the physician prescribing these medications to determine if it is safe to stop or if you need to continue taking. Please do not stop taking these medications without instructions from your surgeon    6. Wear comfortable clothes. Wear glasses instead of contacts. Do not bring any money or jewelry. Please bring picture ID, insurance card, and any prearranged co-payment or hospital payment. Do not wear make-up, particularly mascara the morning of your surgery. Do not wear nail polish, particularly if you are having foot /hand surgery.   Wear your hair loose or down, no ponytails, buns, min pins or clips. All body piercings must be removed. Please shower with antibacterial soap for three consecutive days before and on the morning of surgery, but do not apply any lotions, powders or deodorants after the shower on the day of surgery. Please use a fresh towels after each shower. Please sleep in clean clothes and change bed linens the night before surgery. Please do not shave for 48 hours prior to surgery. Shaving of the face is acceptable. 7. You should understand that if you do not follow these instructions your surgery may be cancelled. If your physical condition changes (I.e. fever, cold or flu) please contact your surgeon as soon as possible. 8. It is important that you be on time. If a situation occurs where you may be late, please call [phone] ([place] Holding Area). 9. If you have any questions and or problems, please call [phone] (Pre-admission Testing). 10. Your surgery time may be subject to change. You will receive a phone call the evening prior if your time changes. 11.  If having outpatient surgery, you must have someone to drive you here, stay with you during the duration of your stay, and to drive you home at time of discharge. Follow instructions per Dr Cameron Leigh regarding vitamins and supplements/fish oil prior to surgery    TAKE ALL MEDICATIONS DAY OF SURGERY EXCEPT:vit d, fish oil. May take all other medications with a sip of water the day of surgery. I understand a pre-operative phone call will be made to verify my surgery time. In the event that I am not available, I give permission for a message to be left on my answering service and/or with another person?   yes         ___________________      __________   _________    (Signature of Patient)             (Witness)                (Date and Time)

## 2022-10-28 NOTE — PROGRESS NOTES
To: Chioma Reid MD  CC:  Brooklyn Rose MD, Dr. Gely Bella    From: Too Lopez MD    Thank you for sending Murleen Brothers to see us. Please note that this dictation was completed with [employer], the computer voice recognition software. Quite often unanticipated grammatical, syntax, homophones, and other interpretive errors are inadvertently transcribed by the software. Please disregard these errors. Please excuse any errors that have escaped final proofreading. Encounter Date: 9/26/2022  History and Physical    Assessment:   Left lower quadrant port site hernia. Tender. Body mass index is 27.53 kg/m². Comorbid hypertension, chronic pain, arthritis. No known h/o heart disease or stroke. S/p prostatectomy. No aspirin or anticoagulation. Plan:   Robotic assisted laparoscopic repair with mesh. Mesh discussed -- patient consents to its use and acknowledges its risks. Discussed possibility of incarceration, strangulation, increase in size of the hernia over time, and the risk of emergency surgery in the face of strangulation. Discussed techniques for reducing the hernia should it not reduce spontaneously. Knows to call immediately for incarceration. Also discussed alternatives to and risks and benefits of surgery. Risks include recurrence, bleeding, hematoma, infection, difficulty voiding, chronic nerve pain, and the risks of general anesthetic. The patient expressed understanding of the risks and all questions were answered to the patient's satisfaction. HPI:   John Torres is a 65 y.o. male who is seen in consultation at the request of Chioma Reid. Symptoms were first noted in 2018. He says he started noticing a lump on his left side and it became painful when he was working in the yard. Pain is described as sharp and cramping. Patient has a bulge. Bulge is reducible. Patient has urinary symptoms. Frequency.   Patient does not have difficulty with bowel movements. Patient does not have nausea or vomiting. Patient does not have history of previous hernia surgery. Patient has history of prior abdominal operations. Above. He is having a chest CT soon ordered by Dr. Wesley Dawn for pulmonary nodules. Please see additional history provided in the \"assessment\" section above. Past Medical History:   Diagnosis Date    Adverse effect of anesthesia     DIFFICULT TO KEEP UNDER ANESTHESIA    Arthritis     Chronic pain     sciatic nerve pain, Left hip    Enlarged prostate     High cholesterol     Hypertension 2015     Past Surgical History:   Procedure Laterality Date    COLONOSCOPY N/A 3/14/2018    COLONOSCOPY performed by Cristal Yusuf MD at [facility] ENDOSCOPY    COLONOSCOPY N/A 3/31/2022    COLONOSCOPY WITH BIOPSY performed by Trey Meadows MD at [facility] ENDOSCOPY    COLONOSCOPY,DIAGNOSTIC  3/14/2018         COLONOSCOPY,REMV Bronwen Boeck  3/31/2022         HX CATARACT REMOVAL Bilateral     HX KNEE ARTHROSCOPY Right     TOTAL RIGHT KNEE REPLACEMENT    HX MOHS PROCEDURES Left 2006    HX PROSTATECTOMY      partial    HX ROTATOR CUFF REPAIR Left     HX TONSILLECTOMY      AS A CHILD    HX TURP      HX TURP      TX COLONOSCOPY FLX DX W/COLLJ SPEC WHEN PFRMD  7/3/2014         TX TOTAL HIP ARTHROPLASTY Left       Family History   Problem Relation Age of Onset    Lung Disease Mother     Cancer Father         PROSTATE    Alzheimer's Disease Father     OSTEOARTHRITIS Sister     No Known Problems Brother     Alzheimer's Disease Other       Social History     Tobacco Use    Smoking status: Former     Packs/day: 1.50     Years: 40.00     Pack years: 60.00     Types: Cigarettes     Quit date:      Years since quittin.8    Smokeless tobacco: Former   Substance Use Topics    Alcohol use:  Yes     Alcohol/week: 2.0 standard drinks     Types: 2 Glasses of wine per week     Comment: 2 PER MONTH      Current Outpatient Medications   Medication Sig    atorvastatin (LIPITOR) 20 mg tablet Take 20 mg by mouth daily. cholecalciferol (VITAMIN D3) 1,000 unit cap Take 1,000 Units by mouth daily. fluticasone propionate (FLONASE) 50 mcg/actuation nasal spray 2 Sprays by Both Nostrils route as needed. amLODIPine (NORVASC) 5 mg tablet Take 5 mg by mouth every morning. Indications: HYPERTENSION    hydrochlorothiazide (HYDRODIURIL) 25 mg tablet Take 25 mg by mouth daily. DULoxetine (CYMBALTA) 30 mg capsule Take 30 mg by mouth nightly. (Patient not taking: No sig reported)    omega 3-dha-epa-fish oil 100-160-1,000 mg cap Take 1 Tab by mouth daily. (Patient not taking: Reported on 9/26/2022)     No current facility-administered medications for this visit. Allergies:  No Known Allergies     Review of Systems:  10 systems reviewed. See scanned sheet in \"Media\" section. See HPI for pertinent positives and negatives. Objective:   Visit Vitals  /65 (BP 1 Location: Right upper arm, BP Patient Position: Sitting, BP Cuff Size: Adult)   Pulse 94   Temp 98.3 °F (36.8 °C)   Resp 20   Ht 6' (1.829 m)   Wt 92.1 kg (203 lb)   SpO2 94%   BMI 27.53 kg/m²       Physical Exam:  General appearance  Alert, cooperative, no distress, appears stated age   HEENT Anicteric   Neck Supple   Back   No CVA tenderness   Lungs   Clear to auscultation bilaterally   Heart  Regular rate and rhythm. Abdomen   Soft. Bowel sounds normal.  Tender left lower quadrant port site hernia. Not entirely reducible. Contains adipose only on ultrasound.    Extremities no cyanosis or edema   Pulses 2+ right radial   Skin Skin color, texture, turgor normal.   Lymph nodes Inguinal nodes normal.   Neurologic Without overt sensory or motor deficit     Signed By: Vivien Gibbons MD     October 27, 2022

## 2022-11-02 ENCOUNTER — ANESTHESIA EVENT (OUTPATIENT)
Dept: SURGERY | Age: 77
End: 2022-11-02
Payer: MEDICARE

## 2022-11-02 NOTE — ANESTHESIA PREPROCEDURE EVALUATION
Anesthetic History     Other anesthesia complications     Comments: Difficult to keep under anesthesia     Review of Systems / Medical History  Patient summary reviewed, nursing notes reviewed and pertinent labs reviewed    Pulmonary          Smoker      Comments:  Former Smoker - Quit 2013 - 60 pack years   Neuro/Psych         Psychiatric history (Anxiety)    Comments: Left-sided sciatica    Insomnia Cardiovascular    Hypertension        Dysrhythmias : PVC  Hyperlipidemia      Comments: ECG (10/2/18): Sinus bradycardia with 1st degree AV block with occasional premature   ventricular complexes   Otherwise normal ECG   When compared with ECG of 26-SEP-2016 09:46,   premature ventricular complexes are now present   GI/Hepatic/Renal               Comments: Left lower quadrant incisional hernia    Hx Rectal bleeding  Cholelithiasis  Dyspepsia Endo/Other        Arthritis and cancer    Comments: Hx BPH s/p Prostatectomy (10/16/18)    Hx Right TKR (10/5/16)   Other Findings   Comments: BPH (benign prostatic hyperplasia)         Physical Exam    Airway  Mallampati: II  TM Distance: > 6 cm  Neck ROM: normal range of motion   Mouth opening: Normal     Cardiovascular  Regular rate and rhythm,  S1 and S2 normal,  no murmur, click, rub, or gallop  Rhythm: regular  Rate: normal         Dental    Dentition: Caps/crowns, Upper dentition intact and Lower dentition intact  Comments: Several missing teeth, none loose.    Pulmonary  Breath sounds clear to auscultation               Abdominal  GI exam deferred       Other Findings            Anesthetic Plan    ASA: 2  Anesthesia type: general    Monitoring Plan: BIS      Induction: Intravenous  Anesthetic plan and risks discussed with: Patient

## 2022-11-03 ENCOUNTER — HOSPITAL ENCOUNTER (OUTPATIENT)
Age: 77
Setting detail: OUTPATIENT SURGERY
Discharge: HOME | End: 2022-11-03
Attending: SURGERY | Admitting: SURGERY
Payer: MEDICARE

## 2022-11-03 ENCOUNTER — ANESTHESIA (OUTPATIENT)
Dept: SURGERY | Age: 77
End: 2022-11-03
Payer: MEDICARE

## 2022-11-03 VITALS
HEART RATE: 72 BPM | OXYGEN SATURATION: 98 % | TEMPERATURE: 98 F | HEIGHT: 72 IN | WEIGHT: 195.99 LBS | RESPIRATION RATE: 14 BRPM | BODY MASS INDEX: 26.55 KG/M2 | DIASTOLIC BLOOD PRESSURE: 64 MMHG | SYSTOLIC BLOOD PRESSURE: 128 MMHG

## 2022-11-03 DIAGNOSIS — K43.2 INCISIONAL HERNIA, WITHOUT OBSTRUCTION OR GANGRENE: Primary | ICD-10-CM

## 2022-11-03 PROCEDURE — 74011000250 HC RX REV CODE- 250: Performed by: NURSE ANESTHETIST, CERTIFIED REGISTERED

## 2022-11-03 PROCEDURE — 77030008771 HC TU NG SALEM SUMP -A: Performed by: ANESTHESIOLOGY

## 2022-11-03 PROCEDURE — 77030026438 HC STYL ET INTUB CARD -A: Performed by: ANESTHESIOLOGY

## 2022-11-03 PROCEDURE — 74011000250 HC RX REV CODE- 250: Performed by: SURGERY

## 2022-11-03 PROCEDURE — 77030020703 HC SEAL CANN DISP INTU -B: Performed by: SURGERY

## 2022-11-03 PROCEDURE — 74011250637 HC RX REV CODE- 250/637: Performed by: SURGERY

## 2022-11-03 PROCEDURE — 77030022704 HC SUT VLOC COVD -B: Performed by: SURGERY

## 2022-11-03 PROCEDURE — 77030008684 HC TU ET CUF COVD -B: Performed by: ANESTHESIOLOGY

## 2022-11-03 PROCEDURE — 77030008756 HC TU IRR SUC STRY -B: Performed by: SURGERY

## 2022-11-03 PROCEDURE — 77030036554: Performed by: SURGERY

## 2022-11-03 PROCEDURE — 74011250636 HC RX REV CODE- 250/636: Performed by: NURSE ANESTHETIST, CERTIFIED REGISTERED

## 2022-11-03 PROCEDURE — 77030013079 HC BLNKT BAIR HGGR 3M -A: Performed by: ANESTHESIOLOGY

## 2022-11-03 PROCEDURE — 77030010507 HC ADH SKN DERMBND J&J -B: Performed by: SURGERY

## 2022-11-03 PROCEDURE — 74011250636 HC RX REV CODE- 250/636: Performed by: ANESTHESIOLOGY

## 2022-11-03 PROCEDURE — 2709999900 HC NON-CHARGEABLE SUPPLY: Performed by: SURGERY

## 2022-11-03 PROCEDURE — 74011250636 HC RX REV CODE- 250/636: Performed by: SURGERY

## 2022-11-03 PROCEDURE — 77030035277 HC OBTRTR BLDELSS DISP INTU -B: Performed by: SURGERY

## 2022-11-03 PROCEDURE — 76010000934 HC OR TIME 1 TO 1.5HR INTENSV - TIER 2: Performed by: SURGERY

## 2022-11-03 PROCEDURE — 77030002933 HC SUT MCRYL J&J -A: Performed by: SURGERY

## 2022-11-03 PROCEDURE — C1781 MESH (IMPLANTABLE): HCPCS | Performed by: SURGERY

## 2022-11-03 PROCEDURE — 77030016151 HC PROTCTR LNS DFOG COVD -B: Performed by: SURGERY

## 2022-11-03 PROCEDURE — 77030018673: Performed by: SURGERY

## 2022-11-03 PROCEDURE — 76210000020 HC REC RM PH II FIRST 0.5 HR: Performed by: SURGERY

## 2022-11-03 PROCEDURE — 76060000033 HC ANESTHESIA 1 TO 1.5 HR: Performed by: SURGERY

## 2022-11-03 PROCEDURE — 76210000006 HC OR PH I REC 0.5 TO 1 HR: Performed by: SURGERY

## 2022-11-03 DEVICE — MESH SURG DIA9CM WHT POLY CLLGN FLM RND MFIL BIOABSRB: Type: IMPLANTABLE DEVICE | Site: ABDOMEN | Status: FUNCTIONAL

## 2022-11-03 RX ORDER — PROPOFOL 10 MG/ML
INJECTION, EMULSION INTRAVENOUS AS NEEDED
Status: DISCONTINUED | OUTPATIENT
Start: 2022-11-03 | End: 2022-11-03 | Stop reason: HOSPADM

## 2022-11-03 RX ORDER — FENTANYL CITRATE 50 UG/ML
25 INJECTION, SOLUTION INTRAMUSCULAR; INTRAVENOUS
Status: DISCONTINUED | OUTPATIENT
Start: 2022-11-03 | End: 2022-11-03 | Stop reason: HOSPADM

## 2022-11-03 RX ORDER — SODIUM CHLORIDE, SODIUM LACTATE, POTASSIUM CHLORIDE, CALCIUM CHLORIDE 600; 310; 30; 20 MG/100ML; MG/100ML; MG/100ML; MG/100ML
25 INJECTION, SOLUTION INTRAVENOUS CONTINUOUS
Status: DISCONTINUED | OUTPATIENT
Start: 2022-11-03 | End: 2022-11-03 | Stop reason: HOSPADM

## 2022-11-03 RX ORDER — POLYETHYLENE GLYCOL 3350 17 G/17G
17 POWDER, FOR SOLUTION ORAL DAILY
Qty: 510 G | Refills: 0 | Status: SHIPPED | OUTPATIENT
Start: 2022-11-03 | End: 2022-12-03

## 2022-11-03 RX ORDER — HYDROCODONE BITARTRATE AND ACETAMINOPHEN 5; 325 MG/1; MG/1
1 TABLET ORAL
Qty: 20 TABLET | Refills: 0 | Status: SHIPPED | OUTPATIENT
Start: 2022-11-03 | End: 2022-11-08

## 2022-11-03 RX ORDER — GLYCOPYRROLATE 0.2 MG/ML
INJECTION INTRAMUSCULAR; INTRAVENOUS AS NEEDED
Status: DISCONTINUED | OUTPATIENT
Start: 2022-11-03 | End: 2022-11-03 | Stop reason: HOSPADM

## 2022-11-03 RX ORDER — HYDROMORPHONE HYDROCHLORIDE 2 MG/ML
INJECTION, SOLUTION INTRAMUSCULAR; INTRAVENOUS; SUBCUTANEOUS AS NEEDED
Status: DISCONTINUED | OUTPATIENT
Start: 2022-11-03 | End: 2022-11-03 | Stop reason: HOSPADM

## 2022-11-03 RX ORDER — FENTANYL CITRATE 50 UG/ML
INJECTION, SOLUTION INTRAMUSCULAR; INTRAVENOUS AS NEEDED
Status: DISCONTINUED | OUTPATIENT
Start: 2022-11-03 | End: 2022-11-03 | Stop reason: HOSPADM

## 2022-11-03 RX ORDER — DIPHENHYDRAMINE HYDROCHLORIDE 50 MG/ML
12.5 INJECTION, SOLUTION INTRAMUSCULAR; INTRAVENOUS AS NEEDED
Status: DISCONTINUED | OUTPATIENT
Start: 2022-11-03 | End: 2022-11-03 | Stop reason: HOSPADM

## 2022-11-03 RX ORDER — IBUPROFEN 600 MG/1
600 TABLET ORAL
Qty: 20 TABLET | Refills: 0 | Status: SHIPPED | OUTPATIENT
Start: 2022-11-03 | End: 2022-11-08

## 2022-11-03 RX ORDER — SODIUM CHLORIDE 0.9 % (FLUSH) 0.9 %
5-40 SYRINGE (ML) INJECTION EVERY 8 HOURS
Status: DISCONTINUED | OUTPATIENT
Start: 2022-11-03 | End: 2022-11-03 | Stop reason: HOSPADM

## 2022-11-03 RX ORDER — LIDOCAINE HYDROCHLORIDE 10 MG/ML
0.1 INJECTION, SOLUTION EPIDURAL; INFILTRATION; INTRACAUDAL; PERINEURAL AS NEEDED
Status: DISCONTINUED | OUTPATIENT
Start: 2022-11-03 | End: 2022-11-03 | Stop reason: HOSPADM

## 2022-11-03 RX ORDER — ROCURONIUM BROMIDE 10 MG/ML
INJECTION, SOLUTION INTRAVENOUS AS NEEDED
Status: DISCONTINUED | OUTPATIENT
Start: 2022-11-03 | End: 2022-11-03 | Stop reason: HOSPADM

## 2022-11-03 RX ORDER — SODIUM CHLORIDE 0.9 % (FLUSH) 0.9 %
5-40 SYRINGE (ML) INJECTION AS NEEDED
Status: DISCONTINUED | OUTPATIENT
Start: 2022-11-03 | End: 2022-11-03 | Stop reason: HOSPADM

## 2022-11-03 RX ORDER — LIDOCAINE HYDROCHLORIDE 20 MG/ML
INJECTION, SOLUTION EPIDURAL; INFILTRATION; INTRACAUDAL; PERINEURAL AS NEEDED
Status: DISCONTINUED | OUTPATIENT
Start: 2022-11-03 | End: 2022-11-03 | Stop reason: HOSPADM

## 2022-11-03 RX ORDER — HYDROMORPHONE HYDROCHLORIDE 1 MG/ML
0.2 INJECTION, SOLUTION INTRAMUSCULAR; INTRAVENOUS; SUBCUTANEOUS
Status: DISCONTINUED | OUTPATIENT
Start: 2022-11-03 | End: 2022-11-03 | Stop reason: HOSPADM

## 2022-11-03 RX ORDER — NEOSTIGMINE METHYLSULFATE 1 MG/ML
INJECTION, SOLUTION INTRAVENOUS AS NEEDED
Status: DISCONTINUED | OUTPATIENT
Start: 2022-11-03 | End: 2022-11-03 | Stop reason: HOSPADM

## 2022-11-03 RX ORDER — HYDROCODONE BITARTRATE AND ACETAMINOPHEN 5; 325 MG/1; MG/1
1 TABLET ORAL
Status: DISCONTINUED | OUTPATIENT
Start: 2022-11-03 | End: 2022-11-03 | Stop reason: HOSPADM

## 2022-11-03 RX ORDER — ONDANSETRON 2 MG/ML
INJECTION INTRAMUSCULAR; INTRAVENOUS AS NEEDED
Status: DISCONTINUED | OUTPATIENT
Start: 2022-11-03 | End: 2022-11-03 | Stop reason: HOSPADM

## 2022-11-03 RX ORDER — DEXAMETHASONE SODIUM PHOSPHATE 4 MG/ML
INJECTION, SOLUTION INTRA-ARTICULAR; INTRALESIONAL; INTRAMUSCULAR; INTRAVENOUS; SOFT TISSUE AS NEEDED
Status: DISCONTINUED | OUTPATIENT
Start: 2022-11-03 | End: 2022-11-03 | Stop reason: HOSPADM

## 2022-11-03 RX ORDER — BUPIVACAINE HYDROCHLORIDE AND EPINEPHRINE 5; 5 MG/ML; UG/ML
INJECTION, SOLUTION EPIDURAL; INTRACAUDAL; PERINEURAL AS NEEDED
Status: DISCONTINUED | OUTPATIENT
Start: 2022-11-03 | End: 2022-11-03 | Stop reason: HOSPADM

## 2022-11-03 RX ADMIN — DEXAMETHASONE SODIUM PHOSPHATE 4 MG: 4 INJECTION, SOLUTION INTRAMUSCULAR; INTRAVENOUS at 14:50

## 2022-11-03 RX ADMIN — SODIUM CHLORIDE, POTASSIUM CHLORIDE, SODIUM LACTATE AND CALCIUM CHLORIDE 25 ML/HR: 600; 310; 30; 20 INJECTION, SOLUTION INTRAVENOUS at 10:38

## 2022-11-03 RX ADMIN — ROCURONIUM BROMIDE 50 MG: 10 INJECTION INTRAVENOUS at 14:45

## 2022-11-03 RX ADMIN — FENTANYL CITRATE 25 MCG: 50 INJECTION INTRAMUSCULAR; INTRAVENOUS at 16:20

## 2022-11-03 RX ADMIN — WATER 2 G: 1 INJECTION INTRAMUSCULAR; INTRAVENOUS; SUBCUTANEOUS at 14:55

## 2022-11-03 RX ADMIN — HYDROCODONE BITARTRATE AND ACETAMINOPHEN 1 TABLET: 5; 325 TABLET ORAL at 16:34

## 2022-11-03 RX ADMIN — FENTANYL CITRATE 25 MCG: 50 INJECTION INTRAMUSCULAR; INTRAVENOUS at 17:13

## 2022-11-03 RX ADMIN — FENTANYL CITRATE 50 MCG: 50 INJECTION, SOLUTION INTRAMUSCULAR; INTRAVENOUS at 15:07

## 2022-11-03 RX ADMIN — HYDROMORPHONE HYDROCHLORIDE 0.5 MG: 2 INJECTION, SOLUTION INTRAMUSCULAR; INTRAVENOUS; SUBCUTANEOUS at 15:48

## 2022-11-03 RX ADMIN — Medication 3 MG: at 15:39

## 2022-11-03 RX ADMIN — PROPOFOL 100 MG: 10 INJECTION, EMULSION INTRAVENOUS at 14:45

## 2022-11-03 RX ADMIN — HYDROMORPHONE HYDROCHLORIDE 0.2 MG: 2 INJECTION, SOLUTION INTRAMUSCULAR; INTRAVENOUS; SUBCUTANEOUS at 15:28

## 2022-11-03 RX ADMIN — Medication 3 AMPULE: at 10:38

## 2022-11-03 RX ADMIN — FENTANYL CITRATE 25 MCG: 50 INJECTION, SOLUTION INTRAMUSCULAR; INTRAVENOUS at 14:37

## 2022-11-03 RX ADMIN — LIDOCAINE HYDROCHLORIDE 100 MG: 20 INJECTION, SOLUTION EPIDURAL; INFILTRATION; INTRACAUDAL; PERINEURAL at 14:44

## 2022-11-03 RX ADMIN — FENTANYL CITRATE 25 MCG: 50 INJECTION INTRAMUSCULAR; INTRAVENOUS at 17:17

## 2022-11-03 RX ADMIN — FENTANYL CITRATE 25 MCG: 50 INJECTION INTRAMUSCULAR; INTRAVENOUS at 16:26

## 2022-11-03 RX ADMIN — FENTANYL CITRATE 25 MCG: 50 INJECTION INTRAMUSCULAR; INTRAVENOUS at 17:31

## 2022-11-03 RX ADMIN — GLYCOPYRROLATE 0.4 MG: 0.2 INJECTION, SOLUTION INTRAMUSCULAR; INTRAVENOUS at 15:39

## 2022-11-03 RX ADMIN — ONDANSETRON HYDROCHLORIDE 4 MG: 2 INJECTION, SOLUTION INTRAMUSCULAR; INTRAVENOUS at 15:35

## 2022-11-03 RX ADMIN — FENTANYL CITRATE 25 MCG: 50 INJECTION, SOLUTION INTRAMUSCULAR; INTRAVENOUS at 14:42

## 2022-11-03 RX ADMIN — FENTANYL CITRATE 25 MCG: 50 INJECTION INTRAMUSCULAR; INTRAVENOUS at 16:13

## 2022-11-03 NOTE — PERIOP NOTES
1549-Handoff Report from Operating Room to PACU    Report received from Jarvis Morales RN regarding Tia Siu Major      Surgeon(s):  Emma Chiu MD  And Procedure(s) (LRB):  ROBOTIC LEFT LOWER QUADRANT INCISIONAL HERNIA REPAIR WITH MESH (Left)  confirmed   with allergies and dressings discussed. Anesthesia type, drugs, patient history, complications, estimated blood loss, vital signs, intake and output, and last pain medication, lines, reversal medications, and temperature were reviewed.

## 2022-11-03 NOTE — PERIOP NOTES
Patient updated regarding delay. He was c/o mouth being dry. He was given sponge with small amount of water to moisten lips.

## 2022-11-03 NOTE — BRIEF OP NOTE
[id number]  Brief Postoperative Note    Patient: Joelle Pelaez YOB: 1945  MRN: [id number]    Date of Procedure: 11/3/2022     Pre-Op Diagnosis: LEFT LOWER QUADRANT INCISIONAL HERNIA    Post-Op Diagnosis: LLQ incarcerated incisional hernia    Procedure(s):  ROBOTIC LEFT LOWER QUADRANT INCISIONAL HERNIA REPAIR WITH MESH    Surgeon(s):  Yari Blandon MD    Surgical Assistant: Surg Asst-1: Eve Herrera RN    Anesthesia: General     Estimated Blood Loss (mL): Minimal    Complications: None    Specimens: * No specimens in log *     Implants:   Implant Name Type Inv. Item Serial No.  Lot No. LRB No. Used Action   MESH SURG DIA9CM WHT POLY CLLGN FLM RND MFIL BIOABSRB - SN/A  MESH SURG DIA9CM WHT POLY CLLGN FLM RND MFIL BIOABSRB N/A [employer] COVID[employer] US SURGICAL_WD [id number] Left 1 Implanted       Drains: * No LDAs found *    Findings: incarcerated epiploic appendages and omentum.       Electronically Signed by Charlotte Joshua MD on 11/3/2022 at 3:36 PM

## 2022-11-03 NOTE — H&P
H&P    Assessment:   LEFT LOWER QUADRANT INCISIONAL HERNIA    Body mass index is 26.58 kg/m². Plan:   ROBOTIC LEFT LOWER QUADRANT INCISIONAL HERNIA REPAIR WITH MESH (Left) Discussed the risk of surgery including bleeding, infection, injury to adjacent structures, and the risks of general anesthetic. The patient understands the risks; any and all questions were answered to the patient's satisfaction. The patient was counseled at length about the risks of yaa Covid-19 during their perioperative period and any recovery window from their procedure. The patient was made aware that yaa Covid-19  may worsen their prognosis for recovering from their procedure and lend to a higher morbidity and/or mortality risk. All material risks, benefits, and reasonable alternatives including postponing the procedure were discussed. The patient wishes to proceed with the procedure at this time. Subjective:      Marybel Singh is a 65 y.o. male who presents for above procedure. Objective:   Blood pressure (!) 141/72, pulse 66, temperature 97.8 °F (36.6 °C), resp. rate 22, height 6' (1.829 m), weight 88.9 kg (195 lb 15.8 oz), SpO2 100 %. Temp (24hrs), Av.8 °F (36.6 °C), Min:97.8 °F (36.6 °C), Max:97.8 °F (36.6 °C)      Physical Exam:  PHYSICAL EXAM:    Chest:   [x]   CTA bialterally, no wheezing/rhonchi/rales/crackles    []   wheezing     []   rhonchi     []   crackles     []   use of accessory muscles    Heart:  [x]   regular rate and rhythm     [x]   No murmurs/rubs/gallops    []   irregular rhythm     []   Murmur     []   Rubs     []   Gallops    Left sided hernia.        Past Medical History:   Diagnosis Date    Adverse effect of anesthesia     DIFFICULT TO KEEP UNDER ANESTHESIA    Arthritis     Chronic pain     sciatic nerve pain, Left hip    Enlarged prostate     High cholesterol     Hypertension 2015    Insomnia     Psychiatric disorder     anxiety     Past Surgical History:   Procedure Laterality Date    COLONOSCOPY N/A 2018    COLONOSCOPY performed by Coty Watts MD at [facility] ENDOSCOPY    COLONOSCOPY N/A 2022    COLONOSCOPY WITH BIOPSY performed by Alonso Santos MD at [facility] ENDOSCOPY    COLONOSCOPY,DIAGNOSTIC  2018         Nobie Mack  2022         HX CATARACT REMOVAL Bilateral     HX KNEE REPLACEMENT Right     HX MOHS PROCEDURES Left 2006    HX PROSTATECTOMY      partial    HX ROTATOR CUFF REPAIR Left     HX TONSILLECTOMY      AS A CHILD    HX TURP      MD COLONOSCOPY FLX DX W/COLLJ SPEC WHEN PFRMD  2014         MD TOTAL HIP ARTHROPLASTY Left       Family History   Problem Relation Age of Onset    Lung Disease Mother     Cancer Father         PROSTATE    Alzheimer's Disease Father     OSTEOARTHRITIS Sister     No Known Problems Brother     Alzheimer's Disease Other      Social History     Socioeconomic History    Marital status:    Tobacco Use    Smoking status: Former     Packs/day: 1.50     Years: 40.00     Pack years: 60.00     Types: Cigarettes     Quit date:      Years since quittin.8    Smokeless tobacco: Never   Vaping Use    Vaping Use: Never used   Substance and Sexual Activity    Alcohol use: Yes     Alcohol/week: 2.0 standard drinks     Types: 2 Glasses of wine per week     Comment: 2 PER MONTH    Drug use: Yes     Types: Marijuana     Comment: 4 times per year      Prior to Admission medications    Medication Sig Start Date End Date Taking? Authorizing Provider   ergocalciferol, vitamin d2, 10 mcg (400 unit) tab Take 1 Tablet by mouth daily. Yes Provider, Historical   atorvastatin (LIPITOR) 20 mg tablet Take 20 mg by mouth daily. Yes Provider, Historical   fluticasone propionate (FLONASE) 50 mcg/actuation nasal spray 2 Sprays by Both Nostrils route daily as needed. Yes Provider, Historical   amLODIPine (NORVASC) 5 mg tablet Take 5 mg by mouth every morning.  Indications: HYPERTENSION   Yes Provider, Historical hydrochlorothiazide (HYDRODIURIL) 25 mg tablet Take 25 mg by mouth daily. Yes Provider, Historical   omega 3-dha-epa-fish oil 100-160-1,000 mg cap Take 1 Tablet by mouth daily. Provider, Historical     ALLERGIES:  No Known Allergies    Review of Systems:    See ROS in scanned \"History & Physical\" from 9/26/22 office visit . No changes.              Signed By: Milton Dan MD     November 3, 2022

## 2022-11-03 NOTE — ANESTHESIA POSTPROCEDURE EVALUATION
Procedure(s):  ROBOTIC LEFT LOWER QUADRANT INCISIONAL HERNIA REPAIR WITH MESH. general    Anesthesia Post Evaluation        Patient location during evaluation: PACU  Note status: Adequate. Level of consciousness: responsive to verbal stimuli and sleepy but conscious  Pain management: satisfactory to patient  Airway patency: patent  Anesthetic complications: no  Cardiovascular status: acceptable  Respiratory status: acceptable  Hydration status: acceptable  Comments: +Post-Anesthesia Evaluation and Assessment    Patient: Lucia Rodriguez MRN: [id number]  SSN: [id number]   YOB: 1945  Age: 65 y.o. Sex: male      Cardiovascular Function/Vital Signs    BP (!) 137/57 (BP 1 Location: Left upper arm, BP Patient Position: At rest;Semi fowlers)   Pulse 62   Temp 36.5 °C (97.7 °F)   Resp 11   Ht 6' (1.829 m)   Wt 88.9 kg (195 lb 15.8 oz)   SpO2 97%   BMI 26.58 kg/m²     Patient is status post Procedure(s):  ROBOTIC LEFT LOWER QUADRANT INCISIONAL HERNIA REPAIR WITH MESH. Nausea/Vomiting: Controlled. Postoperative hydration reviewed and adequate. Pain:  Pain Scale 1: Numeric (0 - 10) (11/03/22 1626)  Pain Intensity 1: 7 (11/03/22 1626)   Managed. Neurological Status:   Neuro (WDL): Within Defined Limits (11/03/22 1615)   At baseline. Mental Status and Level of Consciousness: Arousable. Pulmonary Status:   O2 Device: Nasal cannula (11/03/22 1615)   Adequate oxygenation and airway patent. Complications related to anesthesia: None    Post-anesthesia assessment completed. No concerns. Signed By: Jumana Seay MD    11/3/2022  Post anesthesia nausea and vomiting:  controlled      INITIAL Post-op Vital signs:   Vitals Value Taken Time   /59 11/03/22 1630   Temp 36.5 °C (97.7 °F) 11/03/22 1549   Pulse 66 11/03/22 1634   Resp 13 11/03/22 1634   SpO2 95 % 11/03/22 1634   Vitals shown include unvalidated device data.

## 2022-11-03 NOTE — DISCHARGE INSTRUCTIONS
Discharge Instructions: Hernia -- Dr. Tammy Collins    Call on next business day to arrange appointment for follow up in 3 weeks -- [phone]. Activity:  Walk regularly. No lifting more than 10 pounds for 6 weeks. Light aerobic activity is okay when you feel up to it. You may resume driving in three days unless still requiring narcotics for pain. Return to work in 1-2 weeks but no heavy lifting for 6 weeks. Apply ice to areas of tenderness for 20 minutes at a time. Keep a cloth between the skin and the bag of ice. Work:  You may return to work in 1 or 2 weeks to light activity. No lifting more than 10 pounds (=\"light duty\") for 6 weeks. If your employer can not accommodate \"light duty,\" your employer will need to provide any necessary paperwork to our office. This document with serve as the initial \"note\" to your employer. Diet:  You may resume normal diet. Wound Care:  Glue dressing will fall off on its own. If you experience a lot of drainage, develop redness around the wound, or a fever over 101 F occurs please call the office. There will be significant swelling under the incision(s) that will develop over the next 3-4 days. Ice will help reduce this. Male patients who have inguinal hernia repairs may experience swelling and bruising of the scrotum -- this is normal.  However, if the scrotum becomes tense and painful you should call. Wear a jock strap/athletic supporter for the next week to reduce scrotal swelling. If you can't urinate within 8 hours, call. Intentionally urinate every 2 hours today, even if you don't feel like you have to go. For umbilical, ventral and incisional hernia repairs, wear your abdominal binder at all times for 3 weeks. May remove to shower. May wash binder. Wear a cotton T-shirt under the binder for comfort. Inguinal hernia repairs do not need a binder. You may shower. No baths or swimming for 3 weeks.     Medications:  See attached \"Medication Reconciliation. \"    Resume home medications as indicated on the Medical Reconciliation form. Do not use blood thinners (such as Aspirin, Coumadin, or Plavix) until 3 days after surgery. Pain medications:  Non steroidal antiinflammatories (ibuprofen -- Advil or Motrin) seem to work best for post surgical pain. Try these first.      PUT ICE ON YOUR INCISION(S) 20 MINUTES EVERY HOUR WHILE AWAKE FOR THE NEXT 3 DAYS AT LEAST. PLACE A THIN CLOTH BETWEEN YOUR SKIN AND THE ICE BAG. A narcotic prescription will also be given for breakthrough pain. Tylenol can be used in place of the narcotic, but do not take both at the same time. Colace or Miralax should be used twice daily to prevent constipation while on narcotics. If you are still having trouble having a BM after 1-2 days, try milk of magnesia. If this does not work within 24 hours, try a bottle of magnesium citrate. If this does not work, call us. Narcotics and anesthesia sometimes cause nausea and vomiting. If persistent please call the office. Do not hesitate to call with questions or concerns. Ирина Mckeon MD  Tel [phone]  Fax [phone]   TO PREVENT AN INFECTION      [address] [facility] HANDS    To prevent infection, good handwashing is the most important thing you or your caregiver can do. Wash your hands with soap and water or use the hand  we gave you before you touch any wounds. SHOWER    Use the antibacterial soap we gave you when you take a shower. Shower with this soap until your wounds are healed. To reach all areas of your body, you may need someone to help you. Dont forget to clean your belly button with every shower. USE CLEAN SHEETS    Use freshly cleaned sheets on your bed after surgery. To keep the surgery site clean, do not allow pets to sleep with you while your wound is still healing.      STOP SMOKING    Stop smoking, or at least cut back on smoking    Smoking slows your healing. CONTROL YOUR BLOOD SUGAR    High blood sugars slow wound healing. If you are diabetic, control your blood sugar levels before and after your surgery. TO PREVENT AN INFECTION      [address] YOUR HANDS    To prevent infection, good handwashing is the most important thing you or your caregiver can do. Wash your hands with soap and water or use the hand  we gave you before you touch any wounds. SHOWER    Use the antibacterial soap we gave you when you take a shower. Shower with this soap until your wounds are healed. To reach all areas of your body, you may need someone to help you. Dont forget to clean your belly button with every shower. USE CLEAN SHEETS    Use freshly cleaned sheets on your bed after surgery. To keep the surgery site clean, do not allow pets to sleep with you while your wound is still healing. STOP SMOKING    Stop smoking, or at least cut back on smoking    Smoking slows your healing. CONTROL YOUR BLOOD SUGAR    High blood sugars slow wound healing. If you are diabetic, control your blood sugar levels before and after your surgery. How to Care for Your Wound After Its Treated With  DERMABOND* Topical Skin Adhesive  DERMABOND* Topical Skin Adhesive (2-octyl cyanoacrylate) is a sterile, liquid skin adhesive  that holds wound edges together. The film will usually remain in place for 5 to 10 days, then  naturally fall off your skin. The following will answer some of your questions and provide instructions for proper care for your  wound while it is healing:    CHECK WOUND APPEARANCE   Some swelling, redness, and pain are common with all wounds and normally will go away as the  wound heals. If swelling, redness, or pain increases or if the wound feels warm to the touch,  contact a doctor. Also contact a doctor if the wound edges reopen or separate. REPLACE BANDAGES   If your wound is bandaged, keep the bandage dry. Replace the dressing daily until the adhesive film has fallen off or if the  bandage should become wet, unless otherwise instructed by your  physician. When changing the dressing, do not place tape directly over the  DERMABOND adhesive film, because removing the tape later may also  remove the film. AVOID TOPICAL MEDICATIONS   Do not apply liquid or ointment medications or any other product to your wound while the  DERMABOND adhesive film is in place. These may loosen the film before your wound is healed. KEEP WOUND DRY AND PROTECTED   You may occasionally and briefly wet your wound in the shower or bath. Do not soak or scrub  your wound, do not swim, and avoid periods of heavy perspiration until the DERMABOND  adhesive has naturally fallen off. After showering or bathing, gently blot your wound dry with a  soft towel. If a protective dressing is being used, apply a fresh, dry bandage, being sure to keep  the tape off the DERMABOND adhesive film. Apply a clean, dry bandage over the wound if necessary to protect it. Protect your wound from injury until the skin has had sufficient time to heal.   Do not scratch, rub, or pick at the DERMABOND adhesive film. This may loosen the film before  your wound is healed. Protect the wound from prolonged exposure to sunlight or tanning lamps while the film is in  place. If you have any questions or concerns about this product, please consult your doctor.   *Trademark ©[employer] 2002   DISCHARGE SUMMARY from Nurse    PATIENT INSTRUCTIONS:    After general anesthesia or intravenous sedation, for 24 hours or while taking prescription narcotics:    Have someone responsible help you with your care  Limit your activities  Do not drive and operate hazardous machinery  Do not make important personal, legal or business decisions  Do not drink alcoholic beverages  If you have not urinated within 8 hours after discharge, please contact your surgeon on call  Resume your medications unless otherwise instructed    From general anesthesia, intravenous sedation, or while taking prescription narcotics, you may experience:    Drowsiness, dizziness, sleepiness, or confusion  Difficulty remembering or delayed reaction times  Difficulty with your balance, especially while walking, move slowly and carefully, do not make sudden position changes  Difficulty focusing or blurred vision    You may not be aware of slight changes in your behavior and/or your reaction time because of the medication used during and after your procedure. Report the following to your surgeon:  Excessive pain, swelling, redness or odor of or around the surgical area  Temperature over 100.5  Nausea and vomiting lasting longer than 4 hours or if unable to take medications  Any signs of decreased circulation or nerve impairment to extremity: change in color, persistent numbness, tingling, coldness or increase pain  Any questions or concerns         IF YOU REPORT TO AN EMERGENCY ROOM, DOCTOR'S OFFICE OR HOSPITAL WITHIN 24 HOURS AFTER YOUR PROCEDURE, BRING THIS SHEET AND YOUR AFTER VISIT SUMMARY WITH YOU AND GIVE IT TO THE PHYSICIAN OR NURSE ATTENDING YOU. These are general instructions for a healthy lifestyle (if applicable): No smoking/ No tobacco products/ Avoid exposure to secondhand smoke  Surgeon General's Warning:  Quitting smoking now greatly reduces serious risk to your health. Obesity, smoking, and sedentary lifestyle greatly increases your risk for illness    A healthy diet, regular physical exercise & weight monitoring are important for maintaining a healthy lifestyle    You may be retaining fluid if you have a history of heart failure or if you experience any of the following symptoms:  Weight gain of 3 pounds or more overnight or 5 pounds in a week, increased swelling in our hands or feet or shortness of breath while lying flat in bed.   Please call your doctor as soon as you notice any of these symptoms; do not wait until your next office visit. A common side effect of anesthesia following surgery is nausea and/or vomiting. In order to decrease symptoms, it is wise to avoid foods that are high in fat, greasy foods, milk products, and spicy foods for the first 24 hours. Acceptable foods for the first 24 hours following surgery include but are not limited to:    soup  broth  toast   crackers   applesauce  bananas   mashed potatoes,  soft or scrambled eggs  oatmeal  jello    It is important to eat when taking your pain medication. This will help to prevent nausea. If possible, please try to time your meals with your medications. It is very important to stay hydrated following surgery. Sip fluids frequently while awake. Avoid acidic drinks such as citrus juices and soda for 24 hours. Carbonated beverages may cause bloating and gas. Acceptable fluids include:    water (flavor packets may add variety)  coffee or tea (in moderation)  Gatorade  Rex-Aid  apple juice  cranberry juice    You are encouraged to cough and deep breathe every hour when awake. This will help to prevent respiratory complications following anesthesia. You may want to hug a pillow when coughing and sneezing to add additional support to the surgical area and to decrease discomfort if you had abdominal or chest surgery. If you are discharged home with support stockings, you may remove them after 24 hours. Support stockings are used to help prevent blood clots in the legs following surgery. TO PREVENT AN INFECTION      [address] YOUR HANDS    To prevent infection, good handwashing is the most important thing you or your caregiver can do. Wash your hands with soap and water or use the hand  we gave you before you touch any wounds. SHOWER    Use the antibacterial soap we gave you when you take a shower. Shower with this soap until your wounds are healed.       To reach all areas of your body, you may need someone to help you. Dont forget to clean your belly button with every shower. USE CLEAN SHEETS    Use freshly cleaned sheets on your bed after surgery. To keep the surgery site clean, do not allow pets to sleep with you while your wound is still healing. STOP SMOKING    Stop smoking, or at least cut back on smoking    Smoking slows your healing. CONTROL YOUR BLOOD SUGAR    High blood sugars slow wound healing. If you are diabetic, control your blood sugar levels before and after your surgery. Please take time to review all of your Home Care Instructions and Medication Information sheets provided in your discharge packet. If you have any questions, please contact your surgeon's office. Thank you. The discharge information has been reviewed with the patient and instruction recipient. The patient and instruction recipient verbalized understanding. Discharge medications reviewed with the patient and instruction recipient and appropriate educational materials and side effects teaching were provided. Please provide this summary of care documentation to your next provider.

## 2022-11-07 ENCOUNTER — TELEPHONE (OUTPATIENT)
Dept: SURGERY | Age: 77
End: 2022-11-07

## 2022-11-07 NOTE — TELEPHONE ENCOUNTER
Patient called and is having questions regarding when/how long he should wear binder and if he can receive an extra binder, please call    [phone]

## 2022-11-08 RX ORDER — IBUPROFEN 600 MG/1
TABLET ORAL
Qty: 20 TABLET | Refills: 0 | Status: SHIPPED | OUTPATIENT
Start: 2022-11-08

## 2022-11-15 ENCOUNTER — HOSPITAL ENCOUNTER (OUTPATIENT)
Dept: CT IMAGING | Age: 77
Discharge: HOME | End: 2022-11-15
Attending: INTERNAL MEDICINE
Payer: MEDICARE

## 2022-11-15 DIAGNOSIS — R91.8 PULMONARY NODULES: ICD-10-CM

## 2022-11-15 PROCEDURE — 71250 CT THORAX DX C-: CPT

## 2022-11-22 ENCOUNTER — OFFICE VISIT (OUTPATIENT)
Dept: SURGERY | Age: 77
End: 2022-11-22
Payer: MEDICARE

## 2022-11-22 VITALS
OXYGEN SATURATION: 94 % | HEIGHT: 72 IN | BODY MASS INDEX: 27.39 KG/M2 | RESPIRATION RATE: 18 BRPM | WEIGHT: 202.2 LBS | DIASTOLIC BLOOD PRESSURE: 65 MMHG | SYSTOLIC BLOOD PRESSURE: 129 MMHG | TEMPERATURE: 98.5 F | HEART RATE: 72 BPM

## 2022-11-22 DIAGNOSIS — Z09 POSTOPERATIVE EXAMINATION: Primary | ICD-10-CM

## 2022-11-22 PROCEDURE — 99024 POSTOP FOLLOW-UP VISIT: CPT | Performed by: SURGERY

## 2022-11-22 NOTE — PROGRESS NOTES
Identified pt with two pt identifiers (name and ). Reviewed chart in preparation for visit and have obtained necessary documentation. Elsi Shoemaker is a 65 y.o. male  Chief Complaint   Patient presents with    Surgical Follow-up     S/P 11/3/22 robotic left lower quadrant incisional hernia repair w/mesh. Visit Vitals  /65 (BP 1 Location: Right upper arm, BP Patient Position: Sitting, BP Cuff Size: Large adult)   Pulse 72   Temp 98.5 °F (36.9 °C) (Oral)   Resp 18   Ht 6' (1.829 m)   Wt 91.7 kg (202 lb 3.2 oz)   SpO2 94%   BMI 27.42 kg/m²       1. Have you been to the ER, urgent care clinic since your last visit? Hospitalized since your last visit? No    2. Have you seen or consulted any other health care providers outside of the [address] [place] [address] since your last visit? Include any pap smears or colon screening.  No

## 2022-11-22 NOTE — Clinical Note
11/26/2022    Patient: Luba Erickson YOB: 1945   Date of Visit: 11/22/2022     Navya Navarro MD  [facility] [address]  [address] [phone]  Via Fax: [phone]  MD LOTTIE  [facility]  [address]  [address] [place] [address]    Dear MD Dylan Camacho MD,      Thank you for referring Mr. Cameron Meza to  [facility] for evaluation. My notes for this consultation are attached. If you have questions, please do not hesitate to call me. I look forward to following your patient along with you.       Sincerely,    Fernando De Leon MD

## 2022-11-26 NOTE — PROGRESS NOTES
Status post left lower quadrant incisional hernia repair with mesh 11/3/2022. He states that he is still fairly sore but doing well otherwise. Eating well and having normal bowel movements. He has not needed any pain medication. On exam, the incisions are well-healed and the repair is firmly intact. Assessment and plan. Appropriate recovery. Told him to continue to use ibuprofen and ice as needed. Reminded of activity restrictions through 6 weeks from surgery. Told to call for any concerns.

## 2022-12-30 ENCOUNTER — TRANSCRIBE ORDER (OUTPATIENT)
Dept: SCHEDULING | Age: 77
End: 2022-12-30

## 2022-12-30 DIAGNOSIS — R91.8 LUNG MASS: Primary | ICD-10-CM

## 2023-04-22 DIAGNOSIS — R91.8 LUNG MASS: Primary | ICD-10-CM

## 2024-12-02 ENCOUNTER — HOSPITAL ENCOUNTER (OUTPATIENT)
Facility: HOSPITAL | Age: 79
Discharge: HOME | End: 2024-12-05
Attending: INTERNAL MEDICINE
Payer: MEDICARE

## 2024-12-02 DIAGNOSIS — R91.8 PULMONARY NODULES: ICD-10-CM

## 2024-12-02 PROCEDURE — 71250 CT THORAX DX C-: CPT

## 2025-03-04 ENCOUNTER — APPOINTMENT (OUTPATIENT)
Facility: HOSPITAL | Age: 80
End: 2025-03-04
Payer: MEDICARE

## 2025-03-04 ENCOUNTER — HOSPITAL ENCOUNTER (EMERGENCY)
Facility: HOSPITAL | Age: 80
Discharge: HOME | End: 2025-03-04
Payer: MEDICARE

## 2025-03-04 VITALS
DIASTOLIC BLOOD PRESSURE: 96 MMHG | SYSTOLIC BLOOD PRESSURE: 169 MMHG | HEART RATE: 66 BPM | WEIGHT: 200 LBS | BODY MASS INDEX: 26.51 KG/M2 | TEMPERATURE: 98.1 F | OXYGEN SATURATION: 99 % | HEIGHT: 73 IN | RESPIRATION RATE: 16 BRPM

## 2025-03-04 DIAGNOSIS — M79.605 LEFT LEG PAIN: ICD-10-CM

## 2025-03-04 DIAGNOSIS — I87.2 CHRONIC VENOUS INSUFFICIENCY: Primary | ICD-10-CM

## 2025-03-04 LAB
ALBUMIN SERPL-MCNC: 3.4 G/DL (ref 3.5–5)
ALBUMIN/GLOB SERPL: 1 (ref 1.1–2.2)
ALP SERPL-CCNC: 77 U/L (ref 45–117)
ALT SERPL-CCNC: 18 U/L (ref 12–78)
ANION GAP SERPL CALC-SCNC: 1 MMOL/L (ref 2–12)
AST SERPL-CCNC: 17 U/L (ref 15–37)
BASOPHILS # BLD: 0.05 K/UL (ref 0–0.1)
BASOPHILS NFR BLD: 0.7 % (ref 0–1)
BILIRUB SERPL-MCNC: 0.6 MG/DL (ref 0.2–1)
BUN SERPL-MCNC: 21 MG/DL (ref 6–20)
BUN/CREAT SERPL: 21 (ref 12–20)
CALCIUM SERPL-MCNC: 9.1 MG/DL (ref 8.5–10.1)
CHLORIDE SERPL-SCNC: 106 MMOL/L (ref 97–108)
CO2 SERPL-SCNC: 31 MMOL/L (ref 21–32)
CREAT SERPL-MCNC: 0.99 MG/DL (ref 0.7–1.3)
DIFFERENTIAL METHOD BLD: NORMAL
ECHO BSA: 2.16 M2
EKG ATRIAL RATE: 56 BPM
EKG DIAGNOSIS: NORMAL
EKG P AXIS: -3 DEGREES
EKG P-R INTERVAL: 302 MS
EKG Q-T INTERVAL: 452 MS
EKG QRS DURATION: 90 MS
EKG QTC CALCULATION (BAZETT): 436 MS
EKG R AXIS: 63 DEGREES
EKG T AXIS: 54 DEGREES
EKG VENTRICULAR RATE: 56 BPM
EOSINOPHIL # BLD: 0.05 K/UL (ref 0–0.4)
EOSINOPHIL NFR BLD: 0.7 % (ref 0–7)
ERYTHROCYTE [DISTWIDTH] IN BLOOD BY AUTOMATED COUNT: 12.9 % (ref 11.5–14.5)
GLOBULIN SER CALC-MCNC: 3.3 G/DL (ref 2–4)
GLUCOSE SERPL-MCNC: 101 MG/DL (ref 65–100)
HCT VFR BLD AUTO: 46.5 % (ref 36.6–50.3)
HGB BLD-MCNC: 15.1 G/DL (ref 12.1–17)
IMM GRANULOCYTES # BLD AUTO: 0.03 K/UL (ref 0–0.04)
IMM GRANULOCYTES NFR BLD AUTO: 0.4 % (ref 0–0.5)
LYMPHOCYTES # BLD: 1.35 K/UL (ref 0.8–3.5)
LYMPHOCYTES NFR BLD: 19.3 % (ref 12–49)
MCH RBC QN AUTO: 29 PG (ref 26–34)
MCHC RBC AUTO-ENTMCNC: 32.5 G/DL (ref 30–36.5)
MCV RBC AUTO: 89.4 FL (ref 80–99)
MONOCYTES # BLD: 0.7 K/UL (ref 0–1)
MONOCYTES NFR BLD: 10 % (ref 5–13)
NEUTS SEG # BLD: 4.81 K/UL (ref 1.8–8)
NEUTS SEG NFR BLD: 68.9 % (ref 32–75)
NRBC # BLD: 0 K/UL (ref 0–0.01)
NRBC BLD-RTO: 0 PER 100 WBC
NT PRO BNP: 521 PG/ML
PLATELET # BLD AUTO: 199 K/UL (ref 150–400)
PMV BLD AUTO: 10.2 FL (ref 8.9–12.9)
POTASSIUM SERPL-SCNC: 4.8 MMOL/L (ref 3.5–5.1)
PROT SERPL-MCNC: 6.7 G/DL (ref 6.4–8.2)
RBC # BLD AUTO: 5.2 M/UL (ref 4.1–5.7)
SODIUM SERPL-SCNC: 138 MMOL/L (ref 136–145)
WBC # BLD AUTO: 7 K/UL (ref 4.1–11.1)

## 2025-03-04 PROCEDURE — 83880 ASSAY OF NATRIURETIC PEPTIDE: CPT

## 2025-03-04 PROCEDURE — 6370000000 HC RX 637 (ALT 250 FOR IP)

## 2025-03-04 PROCEDURE — 93005 ELECTROCARDIOGRAM TRACING: CPT

## 2025-03-04 PROCEDURE — 85025 COMPLETE CBC W/AUTO DIFF WBC: CPT

## 2025-03-04 PROCEDURE — 93970 EXTREMITY STUDY: CPT

## 2025-03-04 PROCEDURE — 99285 EMERGENCY DEPT VISIT HI MDM: CPT

## 2025-03-04 PROCEDURE — 36415 COLL VENOUS BLD VENIPUNCTURE: CPT

## 2025-03-04 PROCEDURE — 80053 COMPREHEN METABOLIC PANEL: CPT

## 2025-03-04 PROCEDURE — 71045 X-RAY EXAM CHEST 1 VIEW: CPT

## 2025-03-04 RX ORDER — ACETAMINOPHEN 325 MG/1
650 TABLET ORAL
Status: COMPLETED | OUTPATIENT
Start: 2025-03-04 | End: 2025-03-04

## 2025-03-04 RX ORDER — ACETAMINOPHEN 500 MG
1000 TABLET ORAL EVERY 6 HOURS PRN
Qty: 30 TABLET | Refills: 0 | Status: SHIPPED | OUTPATIENT
Start: 2025-03-04

## 2025-03-04 RX ORDER — FUROSEMIDE 20 MG/1
20 TABLET ORAL DAILY
Qty: 3 TABLET | Refills: 0 | Status: SHIPPED | OUTPATIENT
Start: 2025-03-04 | End: 2025-03-07

## 2025-03-04 RX ADMIN — ACETAMINOPHEN 650 MG: 325 TABLET ORAL at 16:04

## 2025-03-04 ASSESSMENT — PAIN DESCRIPTION - LOCATION: LOCATION: LEG

## 2025-03-04 ASSESSMENT — PAIN DESCRIPTION - ORIENTATION: ORIENTATION: LEFT

## 2025-03-04 ASSESSMENT — PAIN SCALES - GENERAL
PAINLEVEL_OUTOF10: 8
PAINLEVEL_OUTOF10: 8

## 2025-03-04 ASSESSMENT — PAIN - FUNCTIONAL ASSESSMENT: PAIN_FUNCTIONAL_ASSESSMENT: 0-10

## 2025-03-04 ASSESSMENT — PAIN DESCRIPTION - DESCRIPTORS: DESCRIPTORS: ACHING

## 2025-03-04 NOTE — DISCHARGE INSTRUCTIONS
Please take Lasix 20 mg once a day for 3 days to remove some fluid from your leg. Please take tylenol 1000 mg up to 3 times a day to help with pain. Please wear compression stockings. Please elevate your legs for 30 minutes three times a day. Please call [facility] to make an appointment with a vein specialist.

## 2025-03-04 NOTE — ED PROVIDER NOTES
[facility] EMERGENCY DEPARTMENT  EMERGENCY DEPARTMENT ENCOUNTER       Pt Name: Tommy Youngblood Jr.  MRN: [id number]  Birthdate 1945  Date of evaluation: 3/4/2025  Provider: Tess Pascal PA-C   PCP: Rose Tello MD  Note Started: 1:06 PM EST 3/4/25     CHIEF COMPLAINT       Chief Complaint   Patient presents with    Leg Pain     Patient states he has \"blocked valves\" in his legs.  He has had increasing pain to the LLE.  He is also experiencing some swelling to the leg. Was told by cardiology to follow up with vascular but has been unable to get an appointment.         HISTORY OF PRESENT ILLNESS: 1 or more elements      History From: Patient  HPI Limitations: None     Tommy Youngblood Jr. is a 80 y.o. male who presents for evaluation of bilateral lower extremity swelling, left greater than right.  Patient reports that this has been ongoing over the last few months, however reports that it has been worse in the last 2 days and is now painful when he is walking.  Patient reports that he has significant pain to the lower left extremity when he is walking.  Patient reports that he follows with cardiologist Dr. Hyde who told him he has chronic venous insufficiency and he was advised to follow-up with a vascular specialist.  Patient reports he has not yet seen a vascular specialist because they do not come to the [place] office..  He reports he was advised to wear compression stocking, he reports he has been wearing this without much relief.  Patient is complaining of pain to the extremities.  He denies taking any OTC medications for the pain.  Patient denies any fever, chills, nausea, vomiting, extremity numbness, extremity denies, extremity tingling.  He denies any chest pain or shortness of breath.  He does not take any blood thinners.  He denies any history of blood clot.     Nursing Notes were all reviewed and agreed with or any disagreements were addressed in the HPI.     REVIEW OF

## 2025-03-04 NOTE — ED NOTES
Pt given dc instructions and education. 2 prescriptions sent over to preferred pharmacy. Pt ambulatory out of ED w/ steady gait.

## 2025-03-05 ENCOUNTER — CARE COORDINATION (OUTPATIENT)
Dept: OTHER | Facility: CLINIC | Age: 80
End: 2025-03-05

## 2025-03-05 NOTE — CARE COORDINATION
Ambulatory Care Coordination Note     3/5/2025 1:53 PM     Patient outreach attempt by this AC today to offer care management services. ACM was unable to reach the patient by telephone today;   left voice message requesting a return phone call to this ACM.     ACM: Maxine Gallo RN     Care Summary Note: Unable to reach patient at this time.        Follow Up:   Plan for next AC outreach in approximately 1-2 days  to complete:  - outreach attempt to offer care management services.      Maxine Gallo RN BSN  [phone]

## 2025-03-06 ENCOUNTER — CARE COORDINATION (OUTPATIENT)
Dept: OTHER | Facility: CLINIC | Age: 80
End: 2025-03-06

## 2025-03-06 SDOH — HEALTH STABILITY: PHYSICAL HEALTH: ON AVERAGE, HOW MANY MINUTES DO YOU ENGAGE IN EXERCISE AT THIS LEVEL?: 0 MIN

## 2025-03-06 SDOH — HEALTH STABILITY: PHYSICAL HEALTH: ON AVERAGE, HOW MANY DAYS PER WEEK DO YOU ENGAGE IN MODERATE TO STRENUOUS EXERCISE (LIKE A BRISK WALK)?: 0 DAYS

## 2025-03-06 SDOH — ECONOMIC STABILITY: FOOD INSECURITY: WITHIN THE PAST 12 MONTHS, YOU WORRIED THAT YOUR FOOD WOULD RUN OUT BEFORE YOU GOT THE MONEY TO BUY MORE.: NEVER TRUE

## 2025-03-06 SDOH — SOCIAL STABILITY: SOCIAL INSECURITY: ARE YOU MARRIED, WIDOWED, DIVORCED, SEPARATED, NEVER MARRIED, OR LIVING WITH A PARTNER?: DIVORCED

## 2025-03-06 SDOH — HEALTH STABILITY: MENTAL HEALTH
DO YOU FEEL STRESS - TENSE, RESTLESS, NERVOUS, OR ANXIOUS, OR UNABLE TO SLEEP AT NIGHT BECAUSE YOUR MIND IS TROUBLED ALL THE TIME - THESE DAYS?: TO SOME EXTENT

## 2025-03-06 SDOH — SOCIAL STABILITY: SOCIAL NETWORK: HOW OFTEN DO YOU ATTEND MEETINGS OF THE CLUBS OR ORGANIZATIONS YOU BELONG TO?: MORE THAN 4 TIMES PER YEAR

## 2025-03-06 SDOH — ECONOMIC STABILITY: FOOD INSECURITY: HOW HARD IS IT FOR YOU TO PAY FOR THE VERY BASICS LIKE FOOD, HOUSING, MEDICAL CARE, AND HEATING?: NOT HARD AT ALL

## 2025-03-06 SDOH — HEALTH STABILITY: MENTAL HEALTH: HOW OFTEN DO YOU HAVE A DRINK CONTAINING ALCOHOL?: NEVER

## 2025-03-06 SDOH — SOCIAL STABILITY: SOCIAL NETWORK: IN A TYPICAL WEEK, HOW MANY TIMES DO YOU TALK ON THE PHONE WITH FAMILY, FRIENDS, OR NEIGHBORS?: ONCE A WEEK

## 2025-03-06 SDOH — HEALTH STABILITY: MENTAL HEALTH: HOW MANY DRINKS CONTAINING ALCOHOL DO YOU HAVE ON A TYPICAL DAY WHEN YOU ARE DRINKING?: PATIENT DOES NOT DRINK

## 2025-03-06 SDOH — SOCIAL STABILITY: SOCIAL NETWORK: HOW OFTEN DO YOU ATTEND CHURCH OR RELIGIOUS SERVICES?: NEVER

## 2025-03-06 SDOH — SOCIAL STABILITY: SOCIAL NETWORK
DO YOU BELONG TO ANY CLUBS OR ORGANIZATIONS SUCH AS CHURCH GROUPS, UNIONS, FRATERNAL OR ATHLETIC GROUPS, OR SCHOOL GROUPS?: YES

## 2025-03-06 SDOH — ECONOMIC STABILITY: FOOD INSECURITY: WITHIN THE PAST 12 MONTHS, THE FOOD YOU BOUGHT JUST DIDN'T LAST AND YOU DIDN'T HAVE MONEY TO GET MORE.: NEVER TRUE

## 2025-03-06 SDOH — ECONOMIC STABILITY: HOUSING INSECURITY: IN THE LAST 12 MONTHS, WAS THERE A TIME WHEN YOU WERE NOT ABLE TO PAY THE MORTGAGE OR RENT ON TIME?: NO

## 2025-03-06 SDOH — ECONOMIC STABILITY: TRANSPORTATION INSECURITY: IN THE PAST 12 MONTHS, HAS LACK OF TRANSPORTATION KEPT YOU FROM MEDICAL APPOINTMENTS OR FROM GETTING MEDICATIONS?: NO

## 2025-03-06 ASSESSMENT — ACTIVITIES OF DAILY LIVING (ADL): LACK_OF_TRANSPORTATION: NO

## 2025-03-06 NOTE — CARE COORDINATION
Care Manager  [phone]  [email or web address]

## 2025-03-11 ENCOUNTER — CARE COORDINATION (OUTPATIENT)
Dept: OTHER | Facility: CLINIC | Age: 80
End: 2025-03-11

## 2025-03-11 NOTE — CARE COORDINATION
Ambulatory Care Coordination Note     3/11/2025 2:36 PM     Patient Current Location:  [place]     ACM contacted the patient by telephone. Verified name and  with patient as identifiers.         ACM: Maxine Gallo RN     Challenges to be reviewed by the provider   Additional needs identified to be addressed with provider No  none               Method of communication with provider: phone.    Utilization: Patient has not had any utilization since our last call.     Care Summary Note: Pt states he is doing \"terrible,\" reports no change in edema, numbness, tingling to BLE. He completed follow-up with Vascular surgeon on 25 and has testing scheduled for 25 with provider follow-up the next week. ACM provided education and encouragement to keep legs elevated above heart while sitting, and to use Tylenol as needed. Left sciatic pain also has not changed. Will plan to follow-up with pt after provider appt next week. Patient expressed thanks for the call.      Offered patient enrollment in the Remote Patient Monitoring (RPM) program for in-home monitoring: Patient is not eligible for RPM program because: affiliate provider.     Assessments Completed:   General Assessment    Do you have any symptoms that are causing concern?: Yes  Progression since Onset: Unchanged  Reported Symptoms:  (Comment: 25 BLE edema, numbness, tingling, left sciatic pain)          Medications Reviewed:   Completed during a previous call     Advance Care Planning:   Reviewed during previous call      Care Planning:    Goals Addressed                   This Visit's Progress     Conditions and Symptoms   On track     I will schedule office visits, as directed by my provider.  I will keep my appointment or reschedule if I have to cancel.  I will notify my provider of any barriers to my plan of care.  I will notify my provider of any symptoms that indicate a worsening of my condition.    Barriers: overwhelmed by complexity of

## 2025-03-21 ENCOUNTER — CARE COORDINATION (OUTPATIENT)
Dept: OTHER | Facility: CLINIC | Age: 80
End: 2025-03-21

## 2025-03-21 NOTE — CARE COORDINATION
Ambulatory Care Coordination Note     3/21/2025 4:06 PM     Patient Current Location:  [place]     Patient contacted the ACM by telephone. Verified name and  with patient as identifiers.         ACM: Maxine Gallo RN     Challenges to be reviewed by the provider   Additional needs identified to be addressed with provider No  none               Method of communication with provider: chart routing.    Utilization: Patient has not had any utilization since our last call.     Care Summary Note: Pt reports left sciatic and ankle pain is \"about the same.\" He completed Cardiology follow-up with Dr. Hyde on 25 and will have return visit next month. RPM for Blood Pressure continues. Pt also had follow-up with PCP Dr. Tello at [facility] and xray was done of left ankle. Pt states no fracture was seen but there is arthritis present. Pt states he picked up oral diclofenac from pharmacy and takes twice daily as needed. Pt understands potential side effects and will take with food. Discussed diet and physical activity, encouraged lean proteins and fresh vegetables. Pt does not like to cook and eats most foods out of cans. He is able to be a bit more active since last week and was able to get out to grocery store. Encouraged physical activity within acceptable range. Pt is not sure if he'd like to participate in PT right now. Pt states he uses compression stockings sometimes and ACM provided education on donning/doffing. Pt was thankful for follow-up call and would appreciate return call in one week. Will outreach at that time.        Offered patient enrollment in the Remote Patient Monitoring (RPM) program for in-home monitoring: Patient is not eligible for RPM program because: affiliate provider.     Assessments Completed:   General Assessment    Do you have any symptoms that are causing concern?: Yes  Progression since Onset: Gradually Improving  Reported Symptoms: Pain (Comment: 25 Left sciatic and

## 2025-03-28 ENCOUNTER — CARE COORDINATION (OUTPATIENT)
Dept: OTHER | Facility: CLINIC | Age: 80
End: 2025-03-28

## 2025-03-28 NOTE — CARE COORDINATION
Ambulatory Care Coordination Note     3/28/2025 1:43 PM     Patient outreach attempt by this ACM today to perform care management follow up . ACM was unable to reach the patient by telephone today;   left voice message requesting a return phone call to this ACM.     ACM: Maxine Gallo RN     Care Summary Note: Unable to reach patient at this time.          Follow Up:   Plan for next ACM outreach in approximately 1 week to complete:  - goal progression  - education .      Maxine Gallo RN BSN  [phone]

## 2025-04-03 ENCOUNTER — CARE COORDINATION (OUTPATIENT)
Dept: OTHER | Facility: CLINIC | Age: 80
End: 2025-04-03

## 2025-04-03 NOTE — CARE COORDINATION
Ambulatory Care Coordination Note     4/3/2025 2:25 PM     Patient Current Location:  [place]     ACM contacted the patient by telephone. Verified name and  with patient as identifiers.         ACM: Maxine Gallo RN     Challenges to be reviewed by the provider   Additional needs identified to be addressed with provider No  none               Method of communication with provider: chart routing.    Utilization: Patient has not had any utilization since our last call.     Care Summary Note: Pt reports having continued pain in left ankle (x-ray negative at [facility] appt but shows arthritis) and vascular studies negative in left leg. Pt reports pain is 2-3/10 in left ankle and increases with ambulation. Edema is present but improving as he has been walking more. He also has sensation of warmth/wetness in ankle. Pt also reports that left sciatic \"burning\" pain continues, 4-5/10 throughout the day. Pt would like to investigate further so acute issues don't occur again, which led to ED visit on 25. Pt states he is an established pt at [facility] for history of knee replacement and spine, and hand joint arthritis. Pt is agreeable to [facility] calling to schedule appt at [facility] ([place] location) [phone]. Patient expressed thanks for the call.  Will follow-up with pt in one week, or sooner when appt scheduled.     Offered patient enrollment in the Remote Patient Monitoring (RPM) program for in-home monitoring: Patient is not eligible for RPM program because: affiliate provider.     Assessments Completed:   General Assessment    Do you have any symptoms that are causing concern?: Yes  Progression since Onset: Gradually Improving  Reported Symptoms: Pain (Comment: 25 Left ankle and left sciatic pain)          Medications Reviewed:   Completed during a previous call     Advance Care Planning:   Reviewed during previous call      Care Planning:    Goals Addressed                   This  No

## 2025-04-03 NOTE — CARE COORDINATION
Ambulatory Care Coordination Note     4/3/2025 2:49 PM     Called pt to inform him of appt made at [facility] with Dr. Joey Bassett for 04/17/25 at 1:30PM. Patient expressed thanks for the call.      Maxine Gallo RN BSN  [phone]

## 2025-04-03 NOTE — CARE COORDINATION
Ambulatory Care Coordination Note     4/3/2025 2:40 PM     Called and spoke with Radha at [facility] at scheduled appt with Dr. Joey Bassett (foot and ankle specialist) for 04/17/25 at 1:30PM at [place] location [address], [place] [address]    Maxine Gallo RN BSN  [phone]

## 2025-04-10 ENCOUNTER — CARE COORDINATION (OUTPATIENT)
Dept: OTHER | Facility: CLINIC | Age: 80
End: 2025-04-10

## 2025-04-10 NOTE — CARE COORDINATION
Follow up With Specialties Details Why Contact Joey Woo MD Sports Medicine Go on 4/17/2025 At 1:30PM [address] [address] [phone]               Follow Up:   Plan for next ACM outreach in approximately 1 week to complete:  - follow up appointment with Orthopedics .   Patient  is agreeable to this plan.     Maxine Gallo RN BSN  Ambulatory Care Manager  [phone]  [email or web address]

## 2025-04-23 ENCOUNTER — CARE COORDINATION (OUTPATIENT)
Dept: OTHER | Facility: CLINIC | Age: 80
End: 2025-04-23

## 2025-04-23 NOTE — CARE COORDINATION
Ambulatory Care Coordination Note     4/23/2025 1:30 PM     Patient outreach attempt by this ACM today to perform care management follow up . ACM was unable to reach the patient by telephone today;   left voice message requesting a return phone call to this ACM.     ACM: Maxine Gallo RN     Care Summary Note: Unable to reach patient at this time.        Follow Up:   Plan for next ACM outreach in approximately 1 week to complete:  - goal progression  - follow-up appointment with Dr. Bassett (Orthopedics) .      Maxine Gallo RN BSN  [phone]

## 2025-04-24 NOTE — OP NOTES
Medication(s) Requested: lorazepam  Last office visit: 8/26/24  Next office visit 6/17/25  Last refill: 3/25/25 #60  Is the patient due for refill of this medication(s): yes  PDMP review: Criteria not met because patient needs urine drug screen and controlled substance agreement signed. Forwarded to Physician/KAMRON for further review and decision on medication(s) requested.      [address]  OPERATIVE REPORT    Name:  Axel Glover  MR#:  [id number]  :  1945  ACCOUNT #:  [de-identified]  DATE OF SERVICE:  2022    PREOPERATIVE DIAGNOSIS:  Left lower quadrant incisional hernia. POSTOPERATIVE DIAGNOSIS:  Incarcerated left lower quadrant incisional hernia. PROCEDURE PERFORMED:  Robot-assisted laparoscopic repair of incarcerated left lower quadrant incisional hernia with mesh. SURGEON:  Ed Almanza MD    ASSISTANT:  Staff. ANESTHESIA:  General.    COMPLICATIONS:  None. SPECIMENS REMOVED:  None. IMPLANTS:  Covidien mesh. ESTIMATED BLOOD LOSS:  Minimal.    DRAINS:  None. FINDINGS:  There was incarcerated epiploic appendages and omentum within the left lower quadrant site incisional hernia. PROCEDURE:  After obtaining informed consent, the patient was taken to the operating room, placed supine on the operating table. An operative time-out was performed and general endotracheal anesthesia was induced. Preoperative antibiotics were administered and the abdomen was prepped and draped in the usual sterile fashion. An Nicklas Furbish was employed. The abdomen was then entered in the right mid abdomen using an 8-mm optical trocar. The abdomen was inspected and no other obvious pathology was noted. There were some adhesions within the left side. Under direct vision, two additional ports were placed one on either side of the initial port. The robot was docked. Using an atraumatic grasper and electrified neville, the adhesions were taken down and then the left lower quadrant process was exposed. The cold neville were used to dissect out the incarcerated contents away from the abdominal wall and out of the hernia sac. .  Once this was free, we stripped away peritoneum and closed the fascial defect primarily using a #1 absorbable Stratafix.   The mesh was then centered over the primary repair and was secured to the abdominal wall using absorbable 0 V-Loc sutures. The abdomen was inspected for hemostasis and excellent hemostasis was noted. The robot was undocked and the abdomen was desufflated through the ports. These were subsequently removed and the skin incisions were closed with Monocryl and dressed with Dermabond. The patient was recovered from general anesthesia and taken to recovery area in satisfactory condition. All instrument, sponge and needle counts were reported as correct.       Den Hurst MD      [id number]  D:  11/24/2022 13:48  T:  11/24/2022 19:10  JOB #:  [id number]  CC:  MD Jacquelyn Gong MD Wilmon Bolls, MD

## 2025-04-30 ENCOUNTER — CARE COORDINATION (OUTPATIENT)
Dept: OTHER | Facility: CLINIC | Age: 80
End: 2025-04-30

## 2025-04-30 RX ORDER — LOSARTAN POTASSIUM 50 MG/1
50 TABLET ORAL DAILY
COMMUNITY

## 2025-04-30 NOTE — CARE COORDINATION
Ambulatory Care Coordination Note     4/30/2025 4:20 PM     Called pt to encourage him to bring in all medication bottles with him when he goes to [facility]. Pt states he has a picture of them on his phone. Patient expressed thanks for the call.      Maxine Gallo RN BSN  [phone]

## 2025-04-30 NOTE — CARE COORDINATION
Ambulatory Care Coordination Note     2025 4:19 PM     Email to [facility]:     Good afternoon,  Regarding patient Tommy Garland” Ford CHINCHILLA Jr.  1945    Challenges to be reviewed by the provider     Additional needs identified to be addressed with provider Yes  medications-Pt states he is taking losartan 50mg qd. ED AVS from 25 does not indicate Losartan, but Norvasc 5mg qd. Pt does not recall having taken Norvasc. BP has been elevated, SBP has been as high as 180 two days ago. No cardiac symptoms to report.     Pt continues to have left foot swelling and was seen at Dr. 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 OV Note recommends he follow-up with PCP for continued leg swelling. ACM encouraged pt to go to [facility] for evaluation. Pt states he is \"on a break\" from seeing doctors. Provided information of when Dr. Tello will be in office next week.                  Method of communication with provider: staff message.    Utilization: Patient has not had any utilization since our last call.     Care Summary Note: Reviewed New Pt appt with Orthopedics (Dr. Bassett's office) for ongoing left ankle pain. Pt was not aware of diagnosis and education provided. Pt states he received prescription and got measured for custom orthotic boot, and it should be delivered next month. Pt is to follow-up with Dr. Bassett's office in one month as needed. Pt states he is taking Ibuprofen approx once a day. Explained this will not only help with pain, but inflammation. Pt reports still having some leg edema, especially notices at night in left foot and states he is wearing compression stockings and keeping legs elevated while sitting. Noted OV note from Dr. Bassett's office recommends pt to follow-up with PCP regarding swelling. Pt also reports SBP ranging from 120s-180s and was as high as 180 two days ago. Reviewed medication list

## 2025-04-30 NOTE — CARE COORDINATION
Ambulatory Care Coordination Note     2025 3:49 PM     Patient Current Location:  [facility] contacted the patient by telephone. Verified name and  with patient as identifiers.         ACM: Maxine Gallo RN     Challenges to be reviewed by the provider   Additional needs identified to be addressed with provider Yes  medications-Pt states he is taking losartan 50mg qd. ED AVS from 25 does not indicate losartan, but Norvasc 5mg qd. Pt does not recall having taken Norvasc. BP has been elevated, SBP has been as high as 180 two days ago. No cardiac symptoms to report.     Pt continues to have left foot swelling and was seen at Dr. 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 OV Note recommends he follow-up with PCP for continued leg swelling. ACM encouraged pt to go to [facility] for evaluation. Pt states he is \"on a break\" from seeing doctors. Provided information of when Dr. Tello will be in office next week.             Method of communication with provider: staff message.    Utilization: Patient has not had any utilization since our last call.     Care Summary Note: Reviewed New Pt appt with Orthopedics (Dr. Bassett's office) for ongoing left ankle pain. Pt was not aware of diagnosis and education provided. Pt states he received prescription and got measured for custom orthotic boot, and it should be delivered next month. Pt is to follow-up with Dr. Bassett's office in one month as needed. Pt states he is taking Ibuprofen approx once a day. Explained this will not only help with pain, but inflammation. Pt reports still having some leg edema, especially notices at night in left foot and states he is wearing compression stockings and keeping legs elevated while sitting. Noted OV note from Dr. Bassett's office recommends pt to follow-up with PCP regarding swelling. Pt also reports SBP ranging from 120s-180s and was as

## 2025-05-15 ENCOUNTER — CARE COORDINATION (OUTPATIENT)
Dept: OTHER | Facility: CLINIC | Age: 80
End: 2025-05-15

## 2025-05-15 NOTE — CARE COORDINATION
Ambulatory Care Coordination Note     5/15/2025 1:40 PM     Email to [facility]:    Good afternoon!  Regarding Tommy “Sid” Ford CHINCHILLA Jr  1945    Could you please send me his last OV Note from last week?    Thank you!    Maxine Gallo RN BSN  Ambulatory Care Manager  [employer]   [facility] [phone]  [email or web address]

## 2025-05-15 NOTE — CARE COORDINATION
Ambulatory Care Coordination Note     5/15/2025 1:31 PM     Patient Current Location:  [facility] contacted the patient by telephone. Verified name and  with patient as identifiers.         ACM: Maxine Gallo RN     Challenges to be reviewed by the provider   Additional needs identified to be addressed with provider No  none               Method of communication with provider: chart routing.    Utilization: Patient has not had any utilization since our last call.     Care Summary Note: Pt reports he is \"no different\" since last call. BP remains elevated, and pt states BPs today of  174/70 and 147/60. Pt states he saw Dr. Tello at [facility] last week and no medication changes were warranted. Pt continues to express frustration regarding left leg swelling. Pt is awaiting orthotic boot ordered by Dr. Basestt. Pt states should arrive at the end of May. No other concerns or questions at this time and pt agreeable to follow-up call in two weeks.    Offered patient enrollment in the Remote Patient Monitoring (RPM) program for in-home monitoring: Patient is not eligible for RPM program because: affiliate provider.     Assessments Completed:   Hypertension - Encounter Level    Symptom course: worsening (Comment: 05/15/25 Pt states /70, 147/60 this AM)      ,   General Assessment    Do you have any symptoms that are causing concern?: No          Medications Reviewed:   Completed during a previous call     Advance Care Planning:   Reviewed during previous call      Care Planning:    Goals Addressed                   This Visit's Progress     Conditions and Symptoms   On track     I will schedule office visits, as directed by my provider.  I will keep my appointment or reschedule if I have to cancel.  I will notify my provider of any barriers to my plan of care.  I will notify my provider of any symptoms that indicate a worsening of my condition.    Barriers: overwhelmed by complexity of regimen  Plan for

## 2025-05-29 ENCOUNTER — CARE COORDINATION (OUTPATIENT)
Dept: OTHER | Facility: CLINIC | Age: 80
End: 2025-05-29

## 2025-05-29 NOTE — CARE COORDINATION
Ambulatory Care Coordination Note     5/29/2025 1:21 PM     Called and spoke with JR at [facility] and pt has appt for 06/16/25 at 1pm with Baljit. AC informed JR that pt is unable to wear boot and was able to schedule earlier appt for 06/09/25 at 12PM at [facility].     Maxine Gallo RN BSN  [phone]

## 2025-05-29 NOTE — CARE COORDINATION
Ambulatory Care Coordination Note     5/29/2025 1:26 PM     Called pt and left message informing him of notification of BP issues with Dr. Hyde and rescheduled appt with [facility] to 06/09/25.     Maxine Gallo RN BSN  [phone]

## 2025-05-29 NOTE — CARE COORDINATION
Ambulatory Care Coordination Note     2025 12:49 PM     Patient Current Location:  [facility] contacted the patient by telephone. Verified name and  with patient as identifiers.         ACM: Maxine Gallo RN     Challenges to be reviewed by the provider   Additional needs identified to be addressed with provider Yes  BP high and low fluctuations. Pt is on RPM with Cardiology. Writer will call Dr. Hyde's office to inquire               Method of communication with provider: phone.    Utilization: Patient has not had any utilization since our last call.     Care Summary Note: Pt reports BP fluctuations of highs and lows. Pt reports /90 his AM, and checked BP while on phone and BP is 152/69. Pt states he has episodes where BP will be low (~90/60) and he feels flushed and lightheaded. Pt states he is unsure about RPM he is receiving from Cardiology office (Dr. Hyde) and is agreeable to ACM calling to inquire. Pt confirmed he is taking losartan 50mg QD and HCTZ 25mg QD. Advised pt to check BP 2 hours after taking medications and log BPs daily to share with providers. Pt voiced understanding but needs reinforcement. Pt states orthotic boot has been delivered but it does not fit with the shoes he has been wearing. Pt states he has another appt scheduled with [facility], but is unsure when. ACM will call to inquire. Pt reports having gone to Podiatrist to have toe nails cut and wonders if he should just go to this doctor and not follow-up with Dr. Bassett. ACM provided education on difference between providers and encouraged pt to continue follow-up with Dr. Bassett. Pt voiced understanding. Will plan to follow-up with pt upon response from Cardiology, or in two weeks/as needed.     Offered patient enrollment in the Remote Patient Monitoring (RPM) program for in-home monitoring: Patient is not eligible for RPM program because: affiliate provider.     Assessments Completed:   Hypertension -

## 2025-05-29 NOTE — CARE COORDINATION
Ambulatory Care Coordination Note     5/29/2025 1:16 PM     Spoke with staff at Dr. Hyde's office (Cardiology) to inform of fluctuations in BP (175/90 today, with recent BP after medications 152/69, and occasional low BPs ~90/60 with symptoms of hypotension). Pt is on RPM and taking BP medications. Staff states they will get message to Dr. Nancy Williamson's nurse and request call back to writer.     Maxine Gallo RN BSN  [phone]

## 2025-06-12 ENCOUNTER — CARE COORDINATION (OUTPATIENT)
Dept: OTHER | Facility: CLINIC | Age: 80
End: 2025-06-12

## 2025-06-12 NOTE — CARE COORDINATION
Ambulatory Care Coordination Note     6/12/2025 2:09 PM     Patient outreach attempt by this ACM today to perform care management follow up . ACM was unable to reach the patient by telephone today;   left voice message requesting a return phone call to this ACM.     ACM: Maxine Gallo RN     Care Summary Note: Unable to reach patient at this time.      PCP/Specialist follow up:   Future Appointments         Provider Specialty Dept Phone    12/3/2025 2:30 PM (Arrive by 2:00 PM) [facility] CT 1 Radiology [phone]            Follow Up:   Plan for next AC outreach in approximately 2 weeks to complete:  - goal progression  - follow-up appointment with Cardiology .   Maxine Gallo RN BSN  [phone]

## 2025-07-09 ENCOUNTER — CARE COORDINATION (OUTPATIENT)
Dept: OTHER | Facility: CLINIC | Age: 80
End: 2025-07-09

## 2025-07-09 NOTE — CARE COORDINATION
Ambulatory Care Coordination Note     2025 4:17 PM     Patient Current Location:  [facility] contacted the patient by telephone. Verified name and  with patient as identifiers.     Patient graduated from the High Risk Care Management program on 2025.  Patient verbalizes confidence in the ability to self-manage at this time..  Care management goals have been completed. No further Ambulatory Care Manager follow up scheduled.      ACM: Maxine Gallo RN     Challenges to be reviewed by the provider   Additional needs identified to be addressed with provider No  none               Method of communication with provider: phone.    Utilization: Patient has not had any utilization since our last call.     Care Summary Note: Pt reports he is doing well, SBPs have been under control (120s-130s) and no symptoms or concerns. Pt states Dr. Hyde's office called him about BPs and RPM Blood Pressure monitoring and he went to office for assessment/spoke with nurse. Pt states taking all medications as prescribed and has no questions about meds. Leg swelling is also decreased. Pt states he is following up with [facility] regarding left foot orthotic and a new device was ordered. He will follow-up with them in two weeks. Pt reports being able to do yard work outside and seems in good spirits. Pt meeting all Care Management goals and will graduate from program today. Pt knowledgeable about red flags relevant to condition, and when/where to seek treatment. Encouraged pt to call AC with any future CM needs.      Offered patient enrollment in the Remote Patient Monitoring (RPM) program for in-home monitoring: Patient is not eligible for RPM program because: affiliate provider.     Assessments Completed:   Ambulatory Care Coordination Assessment    Care Coordination Protocol  Referral from Primary Care Provider: No  Week 1 - Initial Assessment     Do you have all of your prescriptions and are they filled?:

## (undated) DEVICE — BASIN EMSIS 16OZ GRAPHITE PLAS KID SHP MOLD GRAD FOR ORAL

## (undated) DEVICE — 3000CC GUARDIAN II: Brand: GUARDIAN

## (undated) DEVICE — BLADE ASSEMB CLP HAIR FINE --

## (undated) DEVICE — APPLICATOR BNDG 1MM ADH PREMIERPRO EXOFIN

## (undated) DEVICE — ELECTRODE,RADIOTRANSLUCENT,FOAM,5PK: Brand: MEDLINE

## (undated) DEVICE — DEVICE SECUREMENT AD W/ TRICOT ANCHR PD FOR F LTX SIL CATH

## (undated) DEVICE — FORCEPS BX L240CM JAW DIA2.8MM L CAP W/ NDL MIC MESH TOOTH

## (undated) DEVICE — SUTURE PDS II SZ 1 L27IN ABSRB VLT CT-1 L36MM 1/2 CIR Z341H

## (undated) DEVICE — GDWIRE UROL STR 150CM FLX TP -- BX/5 SENSOR

## (undated) DEVICE — SOLIDIFIER FLD 2OZ 1500CC N DISINF IN BTL DISP SAFESORB

## (undated) DEVICE — GOWN,SIRUS,NONRNF,SETINSLV,2XL,18/CS: Brand: MEDLINE

## (undated) DEVICE — TRAY PREP DRY W/ PREM GLV 2 APPL 6 SPNG 2 UNDPD 1 OVERWRAP

## (undated) DEVICE — BINDER ABD M/L H12IN FOR 46-62IN WHT 4 SLD PNL DSGN HOOP

## (undated) DEVICE — SUTURE MCRYL SZ 4-0 L27IN ABSRB UD L19MM PS-2 1/2 CIR PRIM Y426H

## (undated) DEVICE — TOWEL 4 PLY TISS 19X30 SUE WHT

## (undated) DEVICE — 3M™ TEGADERM™ TRANSPARENT FILM DRESSING FRAME STYLE, 1626W, 4 IN X 4-3/4 IN (10 CM X 12 CM), 50/CT 4CT/CASE: Brand: 3M™ TEGADERM™

## (undated) DEVICE — KENDALL RADIOLUCENT FOAM MONITORING ELECTRODE RECTANGULAR SHAPE: Brand: KENDALL

## (undated) DEVICE — SOLUTION IRRIGATION H2O 0797305] ICU MEDICAL INC]

## (undated) DEVICE — Device

## (undated) DEVICE — 3M™ DURAPORE™ SURGICAL TAPE 1538-3, 3 INCH X 10 YARD (7,5CM X 9,1M), 4 ROLLS/BOX: Brand: 3M™ DURAPORE™

## (undated) DEVICE — SYR 10ML LUER LOK 1/5ML GRAD --

## (undated) DEVICE — DERMABOND SKIN ADH 0.7ML -- DERMABOND ADVANCED 12/BX

## (undated) DEVICE — ARM DRAPE

## (undated) DEVICE — SUTURE DEV SZ 3-0 V-LOC 90 L12IN TO L18IN CV-23 VLT VLOCM0844

## (undated) DEVICE — SOLIDIFIER MEDC 1200ML -- CONVERT TO 356117

## (undated) DEVICE — CLIP LIG ABSRB HEM-LOK LG PUR --

## (undated) DEVICE — VISUALIZATION SYSTEM: Brand: CLEARIFY

## (undated) DEVICE — 1200 GUARD II KIT W/5MM TUBE W/O VAC TUBE: Brand: GUARDIAN

## (undated) DEVICE — LARGE NEEDLE DRIVER: Brand: ENDOWRIST

## (undated) DEVICE — SUTURE ABSRB L6IN L37MM 0 GS-21 GRN 1/2 CIR TAPR PNT NDL VLOCL0306

## (undated) DEVICE — BAG SPEC BIOHZRD 10 X 10 IN --

## (undated) DEVICE — CONTAINER SPEC 20 ML LID NEUT BUFF FORMALIN 10 % POLYPR STS

## (undated) DEVICE — COLUMN DRAPE

## (undated) DEVICE — OBTRTR BLDELSS OPT 8MM DISP -- DA VINICI XI - SNGL USE

## (undated) DEVICE — CLIP SUT ENDOSCP F/2-0/3-0/4-0 -- LAPRA-TY

## (undated) DEVICE — GLOVE SURG SZ 85 CRM LTX FREE POLYISOPRENE POLYMER BEAD ANTI

## (undated) DEVICE — SURGICAL PROCEDURE PACK PROSTCTMY DAVINCI BSR RICHMOND

## (undated) DEVICE — REM POLYHESIVE ADULT PATIENT RETURN ELECTRODE: Brand: VALLEYLAB

## (undated) DEVICE — STERILE POLYISOPRENE POWDER-FREE SURGICAL GLOVES WITH EMOLLIENT COATING: Brand: PROTEXIS

## (undated) DEVICE — CATH IV AUTOGRD BC PNK 20GA 25 -- INSYTE

## (undated) DEVICE — CYSTO/BLADDER IRRIGATION SET, REGULATING CLAMP

## (undated) DEVICE — STRAINER URIN CALC RNL MSH -- CONVERT TO ITEM 357634

## (undated) DEVICE — NEEDLE INSUF L120MM DIA2MM DISP FOR PNEUMOPERI ENDOPATH

## (undated) DEVICE — FLOSEAL HEMOSTATIC MATRIX, 5 ML: Brand: FLOSEAL

## (undated) DEVICE — ELECTRO LUBE IS A SINGLE PATIENT USE DEVICE THAT IS INTENDED TO BE USED ON ELECTROSURGICAL ELECTRODES TO REDUCE STICKING.: Brand: KEY SURGICAL ELECTRO LUBE

## (undated) DEVICE — 3M™ IOBAN™ 2 ANTIMICROBIAL INCISE DRAPE 6650EZ: Brand: IOBAN™ 2

## (undated) DEVICE — SUTURE VCRL SZ 0 L36IN ABSRB VLT L36MM CT-1 1/2 CIR J346H

## (undated) DEVICE — KIT DISPOSABLE ACC 4ARM ENDOWRIST DAVINCI

## (undated) DEVICE — HYPODERMIC SAFETY NEEDLE: Brand: MAGELLAN

## (undated) DEVICE — SET ADMIN 16ML TBNG L100IN 2 Y INJ SITE IV PIGGY BK DISP

## (undated) DEVICE — CATH URETH FOL 3W SH 24FRX5ML -- LUBRICATH

## (undated) DEVICE — SOLUTION SURG PREP 26 CC PURPREP

## (undated) DEVICE — SYR 3ML LL TIP 1/10ML GRAD --

## (undated) DEVICE — PROGRASP FORCEPS: Brand: ENDOWRIST

## (undated) DEVICE — KENDALL SCD EXPRESS SLEEVES, KNEE LENGTH, MEDIUM: Brand: KENDALL SCD

## (undated) DEVICE — GLOVE SURG SZ 8 CRM LTX FREE POLYISOPRENE POLYMER BEAD ANTI

## (undated) DEVICE — DRAIN SURG 19FR L025IN DIA63MM SIL CHN RND FULL FLUT CLS

## (undated) DEVICE — COVER MPLR TIP CRV SCIS ACC DA VINCI

## (undated) DEVICE — INFECTION CONTROL KIT SYS

## (undated) DEVICE — SYR LR LCK 1ML GRAD NSAF 30ML --

## (undated) DEVICE — NON-REM POLYHESIVE PATIENT RETURN ELECTRODE: Brand: VALLEYLAB

## (undated) DEVICE — TOWEL SURG W17XL27IN STD BLU COT NONFENESTRATED PREWASHED

## (undated) DEVICE — Z DISCONTINUED PER MEDLINE LINE GAS SAMPLING O2/CO2 LNG AD 13 FT NSL W/ TBNG FILTERLINE

## (undated) DEVICE — JELLY,LUBE,STERILE,FLIP TOP,TUBE,4-OZ: Brand: MEDLINE

## (undated) DEVICE — DEVON™ KNEE AND BODY STRAP 60" X 3" (1.5 M X 7.6 CM): Brand: DEVON

## (undated) DEVICE — SNARE ENDOSCP M L240CM W27MM SHTH DIA2.4MM CHN 2.8MM OVL

## (undated) DEVICE — COVER,MAYO STAND,STERILE: Brand: MEDLINE

## (undated) DEVICE — APPLICATOR SEAL FLOSEAL 5MM+ --

## (undated) DEVICE — OPEN-END URETERAL CATHETER: Brand: COOK

## (undated) DEVICE — SUTURE DEV SZ 0 L6IN N ABSORBABLE

## (undated) DEVICE — FENESTRATED BIPOLAR FORCEPS: Brand: ENDOWRIST

## (undated) DEVICE — BAG SPEC REM 224ML W4XL6IN DIA10MM 1 HND GYN DISP ENDOPCH

## (undated) DEVICE — SUT ETHLN 2-0 18IN FS BLK --

## (undated) DEVICE — SUT MCRYL 4-0 27IN PS2 UD --

## (undated) DEVICE — PAD 05IN BASE 3IN PEAK M DENS CONVOLUTED FOAM

## (undated) DEVICE — COBRA GRASPER: Brand: ENDOWRIST

## (undated) DEVICE — OBTRTR BLDELSS 8MM DISP -- DA VINCI - SNGL USE

## (undated) DEVICE — SEAL UNIV 5-8MM DISP BX/10 -- DA VINCI XI - SNGL USE

## (undated) DEVICE — NEONATAL-ADULT SPO2 SENSOR: Brand: NELLCOR

## (undated) DEVICE — SCISSORS SURG DIA8MM MPLR CRV ENDOWRIST

## (undated) DEVICE — FILTER CLP DISP FOR 5513E CLIPVAC

## (undated) DEVICE — FILTER SMK EVAC FLO CLR MEGADYNE

## (undated) DEVICE — SOLUTION IRRIG 3000ML 0.9% SOD CHL USP UROMATIC PLAS CONT

## (undated) DEVICE — APPLIER CLP L33CM SHFT DIA11MM 8 SUT ABSRB RELD REUSE LAPRA

## (undated) DEVICE — BASIC PACK: Brand: CONVERTORS

## (undated) DEVICE — TRAP,MUCUS SPECIMEN, 80CC: Brand: MEDLINE

## (undated) DEVICE — GENERAL LAPAROSCOPY-MRMC: Brand: MEDLINE INDUSTRIES, INC.

## (undated) DEVICE — NEEDLE HYPO 18GA L1.5IN PNK S STL HUB POLYPR SHLD REG BVL

## (undated) DEVICE — TIP COVER ACCESSORY

## (undated) DEVICE — SOLUTION IRRIG 1000ML STRL H2O USP PLAS POUR BTL